# Patient Record
Sex: FEMALE | Employment: UNEMPLOYED | URBAN - METROPOLITAN AREA
[De-identification: names, ages, dates, MRNs, and addresses within clinical notes are randomized per-mention and may not be internally consistent; named-entity substitution may affect disease eponyms.]

---

## 2017-01-07 DIAGNOSIS — F41.9 ANXIETY: ICD-10-CM

## 2017-01-09 RX ORDER — ALPRAZOLAM 0.25 MG/1
TABLET ORAL
Qty: 30 TAB | Refills: 0 | Status: SHIPPED
Start: 2017-01-09 | End: 2017-05-10 | Stop reason: SDUPTHER

## 2017-01-25 ENCOUNTER — OFFICE VISIT (OUTPATIENT)
Dept: MEDICAL GROUP | Facility: CLINIC | Age: 67
End: 2017-01-25
Payer: MEDICARE

## 2017-01-25 VITALS
SYSTOLIC BLOOD PRESSURE: 120 MMHG | HEIGHT: 66 IN | WEIGHT: 137 LBS | RESPIRATION RATE: 16 BRPM | DIASTOLIC BLOOD PRESSURE: 80 MMHG | OXYGEN SATURATION: 98 % | BODY MASS INDEX: 22.02 KG/M2 | HEART RATE: 65 BPM | TEMPERATURE: 98.4 F

## 2017-01-25 DIAGNOSIS — R25.1 TREMOR: ICD-10-CM

## 2017-01-25 DIAGNOSIS — F41.9 ANXIETY: ICD-10-CM

## 2017-01-25 DIAGNOSIS — E78.5 DYSLIPIDEMIA: ICD-10-CM

## 2017-01-25 DIAGNOSIS — M85.80 OSTEOPENIA: ICD-10-CM

## 2017-01-25 DIAGNOSIS — E03.9 HYPOTHYROIDISM, UNSPECIFIED TYPE: ICD-10-CM

## 2017-01-25 PROCEDURE — G8432 DEP SCR NOT DOC, RNG: HCPCS | Performed by: INTERNAL MEDICINE

## 2017-01-25 PROCEDURE — G8419 CALC BMI OUT NRM PARAM NOF/U: HCPCS | Performed by: INTERNAL MEDICINE

## 2017-01-25 PROCEDURE — 1036F TOBACCO NON-USER: CPT | Performed by: INTERNAL MEDICINE

## 2017-01-25 PROCEDURE — 3017F COLORECTAL CA SCREEN DOC REV: CPT | Performed by: INTERNAL MEDICINE

## 2017-01-25 PROCEDURE — 4040F PNEUMOC VAC/ADMIN/RCVD: CPT | Mod: 8P | Performed by: INTERNAL MEDICINE

## 2017-01-25 PROCEDURE — 99214 OFFICE O/P EST MOD 30 MIN: CPT | Performed by: INTERNAL MEDICINE

## 2017-01-25 PROCEDURE — 1101F PT FALLS ASSESS-DOCD LE1/YR: CPT | Performed by: INTERNAL MEDICINE

## 2017-01-25 PROCEDURE — 3014F SCREEN MAMMO DOC REV: CPT | Performed by: INTERNAL MEDICINE

## 2017-01-25 PROCEDURE — G8484 FLU IMMUNIZE NO ADMIN: HCPCS | Performed by: INTERNAL MEDICINE

## 2017-01-25 RX ORDER — LEVOTHYROXINE SODIUM 0.07 MG/1
75 TABLET ORAL
Qty: 90 TAB | Refills: 3 | Status: SHIPPED | OUTPATIENT
Start: 2017-01-25 | End: 2017-05-10 | Stop reason: SDUPTHER

## 2017-01-25 ASSESSMENT — PATIENT HEALTH QUESTIONNAIRE - PHQ9: CLINICAL INTERPRETATION OF PHQ2 SCORE: 0

## 2017-01-25 NOTE — MR AVS SNAPSHOT
"        Lina Freeman   2017 2:00 PM   Office Visit   MRN: [id number]    Department:  [facility]   Dept Phone:  [phone]    Description:  Female : 1950   Provider:  Yaquelin Jane M.D.           Allergies as of 2017     No Known Allergies      You were diagnosed with     Hypothyroidism, unspecified type   [8914570]       Tremor   [689355]       Osteopenia   [635136]       Dyslipidemia   [901338]         Vital Signs     Blood Pressure Pulse Temperature Respirations Height Weight    120/80 mmHg 65 36.9 °C (98.4 °F) 16 1.664 m (5' 5.5\") 62.143 kg (137 lb)    Body Mass Index Oxygen Saturation Last Menstrual Period Smoking Status          22.44 kg/m2 98% 2000 Former Smoker        Basic Information     Date Of Birth Sex Race Ethnicity Preferred Language    1950 Female Unknown Non- English      Problem List              ICD-10-CM Priority Class Noted - Resolved    Hypothyroidism E03.9   Unknown - Present    Osteopenia M85.80   Unknown - Present    Anxiety F41.9   2013 - Present    Tremor R25.1   2014 - Present    Essential tremor G25.0   2016 - Present      Health Maintenance        Date Due Completion Dates    IMM DTaP/Tdap/Td Vaccine (1 - Tdap) 1970 ---    PAP SMEAR 1972 ---    IMM ZOSTER VACCINE 2011 ---    IMM PNEUMOCOCCAL 65+ (ADULT) LOW/MEDIUM RISK SERIES (1 of 2 - PCV13) 2016 ---    IMM INFLUENZA (1) 2016 ---    MAMMOGRAM 2017, 2016, 6/3/2015 (Done), 1/15/2014, 2012, 2011    Override on 6/3/2015: Done    BONE DENSITY 2021, 1/15/2014, 2011    COLONOSCOPY 2026            Current Immunizations     No immunizations on file.      Below and/or attached are the medications your provider expects you to take. Review all of your home medications and newly ordered medications with your provider and/or pharmacist. Follow medication instructions as directed by your " provider and/or pharmacist. Please keep your medication list with you and share with your provider. Update the information when medications are discontinued, doses are changed, or new medications (including over-the-counter products) are added; and carry medication information at all times in the event of emergency situations     Allergies:  No Known Allergies          Medications  Valid as of: January 25, 2017 -  4:50 PM    Generic Name Brand Name Tablet Size Instructions for use    ALPRAZolam (Tab) XANAX 0.25 MG TAKE 1 TABLET BY MOUTH AT BEDTIME AS NEEDED FOR SLEEP        Levothyroxine Sodium (Tab) SYNTHROID 75 MCG Take 1 Tab by mouth Every morning on an empty stomach.        .                 Medicines prescribed today were sent to:     [facility] - [place], [place] - [address]    [address] [place] [address]    Phone: [phone] Fax: [phone]    Open 24 Hours?: No      Medication refill instructions:       If your prescription bottle indicates you have medication refills left, it is not necessary to call your provider’s office. Please contact your pharmacy and they will refill your medication.    If your prescription bottle indicates you do not have any refills left, you may request refills at any time through one of the following ways: The online MobileRQ system (except Urgent Care), by calling your provider’s office, or by asking your pharmacy to contact your provider’s office with a refill request. Medication refills are processed only during regular business hours and may not be available until the next business day. Your provider may request additional information or to have a follow-up visit with you prior to refilling your medication.   *Please Note: Medication refills are assigned a new Rx number when refilled electronically. Your pharmacy may indicate that no refills were authorized even though a new prescription for the same medication is available at the pharmacy. Please request the  medicine by name with the pharmacy before contacting your provider for a refill.        Your To Do List     Future Labs/Procedures Complete By Expires    LIPOPROTEIN A  As directed 1/25/2018    VITAMIN D,25 HYDROXY  As directed 1/25/2018         Gigal[employer] Access Code: Activation code not generated  Current Lono[employer] Status: Active

## 2017-01-25 NOTE — PROGRESS NOTES
"Subjective:  Lina is a 66 y.o. female with the following   Past Medical History   Diagnosis Date   • Hypothyroidism    • Osteopenia    • Hyperlipidemia    • Tremor    • Osteopenia       Family History   Problem Relation Age of Onset   • Cancer Mother      breast cancer   • Stroke Mother    • Hypertension Mother    • Cancer Father      lung cancer     The patient is on the following medications:   Current outpatient prescriptions:   •  alprazolam (XANAX) 0.25 MG Tab, TAKE 1 TABLET BY MOUTH AT BEDTIME AS NEEDED FOR SLEEP, Disp: 30 Tab, Rfl: 0  •  levothyroxine (SYNTHROID) 75 MCG Tab, Take 1 Tab by mouth Every morning on an empty stomach., Disp: 90 Tab, Rfl: 1    HPI; patient is here today for follow-up visit, currently on Synthroid for hypothyroidism denies having fatigue or constipation, on  vitamin D supplements for osteopenia denies having back pain, recent fall or injuries, on Xanax for tremor and anxiety denies having suicidal ideation, motor and sensory loss.     ROS:  See HPI    Blood pressure 120/80, pulse 65, temperature 36.9 °C (98.4 °F), resp. rate 16, height 1.664 m (5' 5.5\"), weight 62.143 kg (137 lb), last menstrual period 01/01/2000, SpO2 98 %.on RA  Objective:  Patient is well appearing and in no acute distress.  Pharynx is clear.  Neck is soft and supple with no cervical or supraclavicular lymphadenopathy, thyromegaly or masses, no JVD.  Lungs clear to auscultation bilaterally with normal respiratory effort. Abdomen soft, non-tender on palpation,not distended. Heart regular rate and rhythm without murmur. Extremities without any clubbing, cyanosis, or edema.    Assessment and Plan:          1. Hypothyroidism; clinically is  stable on Synthroid 75 MCG daily.      2. Essential tremor; response to Xanax as needed      3. Anxiety; response to Xanax as needed      4. History of dyslipidemia; patient has preferred no medical therapy.       5. Osteopenia; on Actonel honeymoon, and vitamin D supplements, " currently is asymptomatic.      Patient is advised on preventive and supportive care, diet and lifestyle modification, daily walking ,exercise ,  alternative therapies for prevention of osteoporosis, continue current meds and monitor labs.  Please note that this dictation was created using voice recognition software. I have worked with consultants from the vendor as well as technical experts from [facility] to optimize the interface. I have made every reasonable attempt to correct obvious errors, but I expect that there are errors of grammar and possibly content that I did not discover before finalizing the note.

## 2017-02-03 ENCOUNTER — HOSPITAL ENCOUNTER (OUTPATIENT)
Dept: LAB | Facility: MEDICAL CENTER | Age: 67
End: 2017-02-03
Attending: INTERNAL MEDICINE
Payer: MEDICARE

## 2017-02-03 DIAGNOSIS — M85.80 OSTEOPENIA: ICD-10-CM

## 2017-02-03 DIAGNOSIS — E78.5 DYSLIPIDEMIA: ICD-10-CM

## 2017-02-03 LAB
25(OH)D3 SERPL-MCNC: 36 NG/ML (ref 30–100)
T4 FREE SERPL-MCNC: 1.09 NG/DL (ref 0.53–1.43)
TSH SERPL DL<=0.005 MIU/L-ACNC: 0.61 UIU/ML (ref 0.3–3.7)

## 2017-02-03 PROCEDURE — 84439 ASSAY OF FREE THYROXINE: CPT

## 2017-02-03 PROCEDURE — 82306 VITAMIN D 25 HYDROXY: CPT

## 2017-02-03 PROCEDURE — 84443 ASSAY THYROID STIM HORMONE: CPT

## 2017-02-03 PROCEDURE — 83695 ASSAY OF LIPOPROTEIN(A): CPT

## 2017-02-03 PROCEDURE — 36415 COLL VENOUS BLD VENIPUNCTURE: CPT

## 2017-02-05 LAB — LPA SERPL-MCNC: 15 MG/DL

## 2017-03-21 ENCOUNTER — PATIENT OUTREACH (OUTPATIENT)
Dept: HEALTH INFORMATION MANAGEMENT | Facility: OTHER | Age: 67
End: 2017-03-21

## 2017-03-30 NOTE — PROGRESS NOTES
Attempt #:2    Verify PCP: yes    Communication Preference Obtained: yes     Review Care Team: yes    Annual Wellness Visit Scheduling  1. Scheduling Status:Scheduled          Care Gap Scheduling (Attempt to Schedule EACH Overdue Care Gap!)     Health Maintenance Due   Topic Date Due   • Annual Wellness Visit  SCHEDULED   • PAP SMEAR  INFORMED PT SHE IS DUE         MyChart Activation: already active  [employer] Micheline: no  Virtual Visits: no  Opt In to Text Messages: no

## 2017-04-12 ENCOUNTER — TELEPHONE (OUTPATIENT)
Dept: MEDICAL GROUP | Facility: CLINIC | Age: 67
End: 2017-04-12

## 2017-04-12 NOTE — TELEPHONE ENCOUNTER
ANNUAL WELLNESS VISIT PRE-VISIT PLANNING     1.  Reviewed last PCP office visit assessment and plan notes: Yes    2.  If any orders were placed last visit do we have Results/Consult Notes?        •  Labs? No , Labs NOT completed        •  Imaging? No        •  Referrals?   3.  Patient Care Coordination Note was updated with diagnosis information:  No    4.  Patient is due for these Health Maintenance Topics:   Health Maintenance Due   Topic Date Due   • Annual Wellness Visit  1950   • PAP SMEAR  04/21/1972          •  BRYANT letter was faxed to:   for  records    5.  Immunizations were updated in Epic using WebIZ?: NO, NOT FOUND IN WEB iz       •  Web Iz Recommendations: Patient declines Immunizations recommendations @ this time         •  Is patient due for Tdap/Shingles? Yes.  If yes, was patient alerted of copay?              7.  Updated Care Team with [employer] and all specialists?        •   Gait devices, O2, CPAP, etc: no        •   Eye professional: yes       •   Other specialists (GYN, cardiology, endo, etc): yes    8.  Is patient in need of any refills prior to office visit? No       •    Separate refill encounter created?: no    9.  Patient was informed to arrive 15 min prior to their scheduled appointment and bring in their medication bottles? yes    10.  Patient was advised: “This is a free wellness visit. The provider will screen for medical conditions to help you stay healthy. If you have other concerns to address you may be asked to discuss these at a separate visit or there may be an additional fee.”  Yes

## 2017-05-10 ENCOUNTER — OFFICE VISIT (OUTPATIENT)
Dept: MEDICAL GROUP | Facility: CLINIC | Age: 67
End: 2017-05-10
Payer: MEDICARE

## 2017-05-10 VITALS
SYSTOLIC BLOOD PRESSURE: 122 MMHG | WEIGHT: 135 LBS | OXYGEN SATURATION: 97 % | BODY MASS INDEX: 21.69 KG/M2 | DIASTOLIC BLOOD PRESSURE: 72 MMHG | HEART RATE: 77 BPM | RESPIRATION RATE: 16 BRPM | TEMPERATURE: 99.4 F | HEIGHT: 66 IN

## 2017-05-10 DIAGNOSIS — M85.80 OSTEOPENIA, UNSPECIFIED LOCATION: ICD-10-CM

## 2017-05-10 DIAGNOSIS — E03.9 HYPOTHYROIDISM, UNSPECIFIED TYPE: ICD-10-CM

## 2017-05-10 DIAGNOSIS — R25.1 TREMOR: ICD-10-CM

## 2017-05-10 DIAGNOSIS — Z23 NEED FOR TDAP VACCINATION: ICD-10-CM

## 2017-05-10 PROCEDURE — 90715 TDAP VACCINE 7 YRS/> IM: CPT | Performed by: INTERNAL MEDICINE

## 2017-05-10 PROCEDURE — 1036F TOBACCO NON-USER: CPT | Performed by: INTERNAL MEDICINE

## 2017-05-10 PROCEDURE — 90471 IMMUNIZATION ADMIN: CPT | Performed by: INTERNAL MEDICINE

## 2017-05-10 PROCEDURE — G0438 PPPS, INITIAL VISIT: HCPCS | Mod: 25 | Performed by: INTERNAL MEDICINE

## 2017-05-10 RX ORDER — CALCIUM CARBONATE 300MG(750)
1 TABLET,CHEWABLE ORAL DAILY
COMMUNITY

## 2017-05-10 RX ORDER — ELECTROLYTES/DEXTROSE
1 SOLUTION, ORAL ORAL DAILY
COMMUNITY

## 2017-05-10 RX ORDER — LEVOTHYROXINE SODIUM 0.07 MG/1
75 TABLET ORAL
Qty: 90 TAB | Refills: 3 | Status: SHIPPED | OUTPATIENT
Start: 2017-05-10 | End: 2018-05-16 | Stop reason: SDUPTHER

## 2017-05-10 RX ORDER — ALPRAZOLAM 0.25 MG/1
TABLET ORAL
Qty: 30 TAB | Refills: 3 | Status: SHIPPED | OUTPATIENT
Start: 2017-05-10 | End: 2019-05-07

## 2017-05-10 ASSESSMENT — PATIENT HEALTH QUESTIONNAIRE - PHQ9
CLINICAL INTERPRETATION OF PHQ2 SCORE: 0
SUM OF ALL RESPONSES TO PHQ QUESTIONS 1-9: 0

## 2017-05-10 NOTE — PROGRESS NOTES
Chief Complaint   Patient presents with   • Annual Wellness Visit         HPI:  Lina Freeman is a 67 y.o. female here for Medicare Annual Wellness Visit. Currently on Synthroid for hypothyroidism denies having cold intolerance or fatigue, on vitamin D and oral supplements for osteopenia and is having back pain, on  Xanax as needed for essential tremor denies having motor or sensory loss.         Patient Active Problem List    Diagnosis Date Noted   • Essential tremor 02/26/2016   • Tremor 01/22/2014   • Anxiety 05/23/2013   • Hypothyroidism    • Osteopenia        Current Outpatient Prescriptions   Medication Sig Dispense Refill   • Multiple Vitamins-Minerals (MULTIVITAMIN ADULT) Tab Take 1 Tab by mouth every day.     • Calcium Carb-Cholecalciferol (CALCIUM 1000 + D PO) Take 1 Tab by mouth every day.     • Cholecalciferol (VITAMIN D-3 PO) Take 4,600 Units by mouth every day.     • Magnesium 400 MG Tab Take 1 Tab by mouth every day.     • Omega 3-6-9 Fatty Acids (TRIPLE OMEGA COMPLEX PO) Take 1 Tab by mouth every day.     • B Complex-C (SUPER B COMPLEX PO) Take 1 Tab by mouth every day.     • levothyroxine (SYNTHROID) 75 MCG Tab Take 1 Tab by mouth Every morning on an empty stomach. 90 Tab 3   • alprazolam (XANAX) 0.25 MG Tab TAKE 1 TABLET BY MOUTH AT BEDTIME AS NEEDED FOR SLEEP 30 Tab 0     No current facility-administered medications for this visit.        Patient is taking medications as noted in medication list.  Current supplements as per medication list.   Chronic narcotic pain medicines: no    Allergies: Review of patient's allergies indicates no known allergies.    Current social contact/activities: Pt. Enjoys attending the GYM, spends time with her family, pt. Enjoys taking care of her garden, pt. Enjoys spending time with her neighbors and pt. Volunteers for the [employer] 1-2 times per week.     Is patient current with immunizations?  No, due for PREVNAR (PCV13) , TDAP and ZOSTAVAX (Shingles).  Patient is interested in receiving TDAP today.     She  reports that she quit smoking about 28 years ago. Her smoking use included Cigarettes. She has a 20 pack-year smoking history. She has never used smokeless tobacco. She reports that she drinks about 1.0 oz of alcohol per week. She reports that she does not use illicit drugs.  Counseling given: Not Answered        DPA/Advanced Directive:  Patient has Advanced Directive on file.     ROS:    Gait: Uses no assistive device   Ostomy: no  Other tubes: no   Amputations: no   Chronic oxygen use: no   Last eye exam: 7/15/2016   Wears hearing aids: no   : Denies incontinence.         Depression Screening    Little interest or pleasure in doing things?  0 - not at all  Feeling down, depressed, or hopeless?  0 - not at all  Patient Health Questionnaire Score: 0  If depressive symptoms identified deferred to follow up visit unless specifically addressed in assessment and plan.    Interpretation of PHQ-9 Total Score   Score Severity   1-4 Minimal Depression   5-9 Mild Depression   10-14 Moderate Depression   15-19 Moderately Severe Depression   20-27 Severe Depression    Screening for Cognitive Impairment    Three Minute Recall (banana, sunrise, fence)  3/3    Draw clock face with all 12 numbers set to the hand to show 10 minutes past 11 o'clock  1 5/5  If cognitive concerns identified deferred to follow up visit unless specifically addressed in assessment and plan.    Fall Risk Assessment    Has the patient had two or more falls in the last year or any fall with injury in the last year?  No  If Fall Risk identified deferred to follow up visit unless specifically addressed in assessment and plan.    Safety Assessment    Throw rugs on floor.  Yes  Handrails on all stairs.  Yes  Good lighting in all hallways.  Yes  Difficulty hearing.  No  Patient counseled about all safety risks that were identified.    Functional Assessment ADLs    Are there any barriers preventing you  from cooking for yourself or meeting nutritional needs?  No.    Are there any barriers preventing you from driving safely or obtaining transportation?  No.    Are there any barriers preventing you from using a telephone or calling for help?  No.    Are there any barriers preventing you from shopping?  No.    Are there any barriers preventing you from taking care of your own finances?  No.    Are there any barriers preventing you from managing your medications?  No.    Are currently engaging any exercise or physical activity?  Yes.  Pt. Attends yoga once a week with 1 hr classes.   Pt. Walks about 2-3 times/wk for at least 2 miles each.  Pt. Works out with weight lifting for approximately 15mt each.     Health Maintenance Summary                Annual Wellness Visit Overdue 1950     IMM PNEUMOCOCCAL 65+ (ADULT) LOW/MEDIUM RISK SERIES Postponed 3/30/2018 Originally 4/21/2016. Patient Refused    IMM DTaP/Tdap/Td Vaccine Postponed 3/30/2018 Originally 4/21/1970. Patient Refused    IMM ZOSTER VACCINE Postponed 3/30/2018 Originally 4/21/2011. Patient Refused    MAMMOGRAM Next Due 7/1/2017      Done 7/1/2016 MA-SCREEN MAMMO W/CAD-BILAT     Patient has more history with this topic...    BONE DENSITY Next Due 7/22/2021      Done 7/22/2016 DS-BONE DENSITY STUDY (DEXA)     Patient has more history with this topic...    COLONOSCOPY Next Due 6/8/2026      Done 6/8/2016 AMB REFERRAL TO GI FOR COLONOSCOPY          Patient Care Team:  Yaquelin Jane M.D. as PCP - General (Internal Medicine)  Noé Thompson O.D. as Consulting Physician (Optometry)  Laura Patel M.D. as Consulting Physician (OB/Gyn)  Nate Turcios D.D.SClary as Consulting Physician (Dentistry)    Social History   Substance Use Topics   • Smoking status: Former Smoker -- 1.00 packs/day for 20 years     Types: Cigarettes     Quit date: 02/26/1989   • Smokeless tobacco: Never Used   • Alcohol Use: 1.0 oz/week     2 Cans of beer per week      Comment: 2-3 x  "week     Family History   Problem Relation Age of Onset   • Cancer Mother      breast cancer   • Stroke Mother    • Hypertension Mother    • Cancer Father      lung cancer     She  has a past medical history of Hypothyroidism; Osteopenia; Hyperlipidemia; Tremor; and Osteopenia.   History reviewed. No pertinent past surgical history.        Exam:     Blood pressure 122/72, pulse 77, temperature 37.4 °C (99.4 °F), resp. rate 16, height 1.67 m (5' 5.75\"), weight 61.236 kg (135 lb), last menstrual period 01/01/2000, SpO2 97 %. Body mass index is 21.96 kg/(m^2).    Hearing; good  Dentition; good  Alert, oriented in no acute distress.  Eye contact is good, speech goal directed, affect calm      Assessment and Plan. The following treatment and monitoring plan is recommended:      1. Hypothyroidism; clinically is  stable on Synthroid 75 MCG daily.       Ref. Range 2/29/2016 07:45 8/12/2016 12:32 2/3/2017 12:12   TSH Latest Ref Range: 0.300-3.700 uIU/mL 1.540 0.610 0.610   Free T-4 Latest Ref Range: 0.53-1.43 ng/dL 1.02 1.20 1.09     2. Essential tremor; responds to Xanax as needed        3. History of dyslipidemia; patient has preferred no medical therapy.        Ref. Range 2/29/2016 07:45 2/3/2017 12:12   Cholesterol,Tot Latest Ref Range: 100-199 mg/dL 217 (H)    Triglycerides Latest Ref Range: 0-149 mg/dL 91    HDL Latest Ref Range: >=40 mg/dL 89    LDL Latest Ref Range: <100 mg/dL 110 (H)    Lipoprotein A Latest Ref Range: <=29 mg/dL  15     4. Osteopenia; on Actonel honeymoon, and vitamin D supplements, currently is asymptomatic.      5. Request of Tdab vaccination.    Patient is advised on preventive and supportive care, diet and lifestyle modification, daily walking ,exercise , Tdab vaccine  continue current meds and monitor labs.  Services suggested;  Health Care Screening recommendations as per orders if indicated.  Referrals offered: PT/OT/Nutrition counseling/Behavioral Health/Smoking cessation as per orders if " indicated.    Discussion today about general wellness and lifestyle habits:    · Prevent falls and reduce trip hazards; Cautioned about securing or removing rugs.  · Have a working fire alarm and carbon monoxide detector;   · Engage in regular physical activity and social activities       Follow-up: No Follow-up on file.

## 2017-05-12 ENCOUNTER — HOSPITAL ENCOUNTER (OUTPATIENT)
Dept: RADIOLOGY | Facility: MEDICAL CENTER | Age: 67
End: 2017-05-12
Attending: OBSTETRICS & GYNECOLOGY
Payer: MEDICARE

## 2017-05-12 DIAGNOSIS — N63.0 LUMP OR MASS IN BREAST: ICD-10-CM

## 2017-05-12 PROCEDURE — G0204 DX MAMMO INCL CAD BI: HCPCS

## 2017-05-12 PROCEDURE — 76642 ULTRASOUND BREAST LIMITED: CPT | Mod: RT

## 2017-06-06 ENCOUNTER — HOSPITAL ENCOUNTER (OUTPATIENT)
Facility: MEDICAL CENTER | Age: 67
End: 2017-06-06
Attending: OBSTETRICS & GYNECOLOGY
Payer: MEDICARE

## 2017-06-06 PROCEDURE — 88175 CYTOPATH C/V AUTO FLUID REDO: CPT

## 2017-06-07 LAB — CYTOLOGY REG CYTOL: NORMAL

## 2017-10-30 ENCOUNTER — PATIENT OUTREACH (OUTPATIENT)
Dept: HEALTH INFORMATION MANAGEMENT | Facility: OTHER | Age: 67
End: 2017-10-30

## 2017-10-30 NOTE — PROGRESS NOTES
Outcome: Left Message    Please transfer to Patient Outreach Team at [phone] when patient returns call.    WebIZ Checked & Epic Updated:  yes    [facility]Connect Verified: yes    Attempt # 1

## 2018-02-27 ENCOUNTER — OFFICE VISIT (OUTPATIENT)
Dept: MEDICAL GROUP | Facility: PHYSICIAN GROUP | Age: 68
End: 2018-02-27
Payer: MEDICARE

## 2018-02-27 VITALS
DIASTOLIC BLOOD PRESSURE: 68 MMHG | SYSTOLIC BLOOD PRESSURE: 116 MMHG | TEMPERATURE: 98.2 F | WEIGHT: 135 LBS | BODY MASS INDEX: 21.69 KG/M2 | OXYGEN SATURATION: 98 % | HEART RATE: 79 BPM | HEIGHT: 66 IN

## 2018-02-27 DIAGNOSIS — L98.9 BENIGN SKIN LESION OF NOSE: ICD-10-CM

## 2018-02-27 PROCEDURE — 99213 OFFICE O/P EST LOW 20 MIN: CPT | Performed by: FAMILY MEDICINE

## 2018-02-27 NOTE — PROGRESS NOTES
"Subjective:   Lina Freeman is a 67 y.o. female here today for bump on nose    HPI :     Small bump on the left side of the nose , close to the brow that has been there for 2 weeks.No warmth or tenderness but hurts to pt on glasses for the last 2 days.Chronically ongoing for several years, comes and goes.    Current medicines (including changes today)  Current Outpatient Prescriptions   Medication Sig Dispense Refill   • Multiple Vitamins-Minerals (MULTIVITAMIN ADULT) Tab Take 1 Tab by mouth every day.     • Calcium Carb-Cholecalciferol (CALCIUM 1000 + D PO) Take 1 Tab by mouth every day.     • Cholecalciferol (VITAMIN D-3 PO) Take 4,600 Units by mouth every day.     • Magnesium 400 MG Tab Take 1 Tab by mouth every day.     • Omega 3-6-9 Fatty Acids (TRIPLE OMEGA COMPLEX PO) Take 1 Tab by mouth every day.     • B Complex-C (SUPER B COMPLEX PO) Take 1 Tab by mouth every day.     • levothyroxine (SYNTHROID) 75 MCG Tab Take 1 Tab by mouth Every morning on an empty stomach. 90 Tab 3   • alprazolam (XANAX) 0.25 MG Tab TAKE 1 TABLET BY MOUTH AT BEDTIME AS NEEDED FOR SLEEP 30 Tab 3     No current facility-administered medications for this visit.      She  has a past medical history of Hyperlipidemia; Hypothyroidism; Osteopenia; Osteopenia; and Tremor.    ROS   No chest pain, no shortness of breath, no abdominal pain  No fever, open wounds, headache, nasal congestion, cough, sinus pressure      Objective:     Blood pressure 116/68, pulse 79, temperature 36.8 °C (98.2 °F), height 1.67 m (5' 5.75\"), weight 61.2 kg (135 lb), last menstrual period 01/01/2000, SpO2 98 %, not currently breastfeeding. Body mass index is 21.96 kg/m².     PHYSICAL EXAM     GEN: Alert and oriented,well appearing, no acute distress  SKIN: warm, dry to touch, non tender mobile non erythematous nodule on the left side of the bridge of the nose  PSYCH: mood and affect normal, judgement normal  RESPIRATORY : Unlabored respiratory effort, no " distress noted, clear to auscultation bilaterally, no wheeze, rhonchi, crackles  CARDIOVASCULAR: RRR, S1 S2 normal  MUSCULOSKELETAL : Normal gait and stance, no obvious abnormalities   NEURO: No overt focal neurologic deficits,sensation intact        Assessment and Plan:   The following treatment plan was discussed    1. Benign skin lesion of nose  Unclear etiology but most likely benign.No signs of infection.reassured patient and advised to monitor.Patient also has appointment with Dermatologist and and will check with her.    Followup: Return if symptoms worsen or fail to improve.         Please note that this dictation was created using voice recognition software. I have made every reasonable attempt to correct obvious errors, but I expect that there are errors of grammar and possibly content that I did not discover before finalizing the note.

## 2018-05-11 ENCOUNTER — OFFICE VISIT (OUTPATIENT)
Dept: MEDICAL GROUP | Facility: PHYSICIAN GROUP | Age: 68
End: 2018-05-11
Payer: MEDICARE

## 2018-05-11 VITALS
TEMPERATURE: 97.1 F | DIASTOLIC BLOOD PRESSURE: 76 MMHG | OXYGEN SATURATION: 99 % | HEART RATE: 62 BPM | BODY MASS INDEX: 21.69 KG/M2 | WEIGHT: 135 LBS | SYSTOLIC BLOOD PRESSURE: 126 MMHG | HEIGHT: 66 IN

## 2018-05-11 DIAGNOSIS — N39.41 URGE INCONTINENCE: ICD-10-CM

## 2018-05-11 DIAGNOSIS — E03.9 ACQUIRED HYPOTHYROIDISM: ICD-10-CM

## 2018-05-11 DIAGNOSIS — G25.0 ESSENTIAL TREMOR: ICD-10-CM

## 2018-05-11 DIAGNOSIS — E78.5 DYSLIPIDEMIA: ICD-10-CM

## 2018-05-11 DIAGNOSIS — R79.89 LOW SERUM VITAMIN D: ICD-10-CM

## 2018-05-11 DIAGNOSIS — M85.89 OSTEOPENIA OF MULTIPLE SITES: ICD-10-CM

## 2018-05-11 PROCEDURE — 99215 OFFICE O/P EST HI 40 MIN: CPT | Performed by: FAMILY MEDICINE

## 2018-05-11 ASSESSMENT — PATIENT HEALTH QUESTIONNAIRE - PHQ9: CLINICAL INTERPRETATION OF PHQ2 SCORE: 0

## 2018-05-11 NOTE — ASSESSMENT & PLAN NOTE
New  problem. Patient is reporting that she has issues with having the urge to urinate and barely making it to the bathroom. She states that this occurs off and on. At this point she does not ascribe any embarrassing episodes.

## 2018-05-11 NOTE — ASSESSMENT & PLAN NOTE
Ongoing issue; patient reports compliance with her levothyroxine 75 µg daily; currently denies any issues or temperature intolerance, skin changes, hair changes but is reporting some fatigue. Labs have not been evaluated and over one year.

## 2018-05-11 NOTE — ASSESSMENT & PLAN NOTE
Ongoing issue; patient denies any new issues with long bone or vertebral fractures; she does weightbearing exercise, takes calcium, take supplemental vitamin D.

## 2018-05-11 NOTE — ASSESSMENT & PLAN NOTE
Ongoing issue; patient does report a tremor they can get worse with activity or anxious situations. In the past she had been given Xanax 0.25 mg to use as needed for symptom management. I have explained to the patient due to the controlled substance [place] I will not be refilling the medication and would prefer to change her over to a noncontrolled substance. Patient states that her symptoms are not that bad and she otherwise will not seek further treatment.

## 2018-05-11 NOTE — PROGRESS NOTES
Subjective:   Lina Freeman is a 68 y.o. female here today for thyroid, urge incontinence, low vitamin D, elevated total cholesterol, osteopenia, essential tremor    Hypothyroidism  Ongoing issue; patient reports compliance with her levothyroxine 75 µg daily; currently denies any issues or temperature intolerance, skin changes, hair changes but is reporting some fatigue. Labs have not been evaluated and over one year.    Osteopenia  Ongoing issue; patient denies any new issues with long bone or vertebral fractures; she does weightbearing exercise, takes calcium, take supplemental vitamin D.    Essential tremor  Ongoing issue; patient does report a tremor they can get worse with activity or anxious situations. In the past she had been given Xanax 0.25 mg to use as needed for symptom management. I have explained to the patient due to the controlled substance [place] I will not be refilling the medication and would prefer to change her over to a noncontrolled substance. Patient states that her symptoms are not that bad and she otherwise will not seek further treatment.    Low serum vitamin D  Ongoing issue; review patient's chart shows that in the past her vitamin D level has been suboptimal at 33. As stated above she does take supplement of vitamin D 3000 international units daily    Urge incontinence  New  problem. Patient is reporting that she has issues with having the urge to urinate and barely making it to the bathroom. She states that this occurs off and on. At this point she does not ascribe any embarrassing episodes.    Dyslipidemia  Ongoing issues; review patient's chart shows in the past she has had an elevated total cholesterol. She currently does not take any prescription medications for this issue. Does take fish oil supplements and tries eating healthy diet.     Patient is here today to establish care    Current medicines (including changes today)  Current Outpatient Prescriptions  "  Medication Sig Dispense Refill   • Multiple Vitamins-Minerals (MULTIVITAMIN ADULT) Tab Take 1 Tab by mouth every day.     • Calcium Carb-Cholecalciferol (CALCIUM 1000 + D PO) Take 1 Tab by mouth every day.     • Cholecalciferol (VITAMIN D-3 PO) Take 4,600 Units by mouth every day.     • Magnesium 400 MG Tab Take 1 Tab by mouth every day.     • Omega 3-6-9 Fatty Acids (TRIPLE OMEGA COMPLEX PO) Take 1 Tab by mouth every day.     • B Complex-C (SUPER B COMPLEX PO) Take 1 Tab by mouth every day.     • levothyroxine (SYNTHROID) 75 MCG Tab Take 1 Tab by mouth Every morning on an empty stomach. 90 Tab 3   • alprazolam (XANAX) 0.25 MG Tab TAKE 1 TABLET BY MOUTH AT BEDTIME AS NEEDED FOR SLEEP 30 Tab 3     No current facility-administered medications for this visit.      She  has a past medical history of Hyperlipidemia; Hypothyroidism; Osteopenia; Osteopenia; and Tremor.    ROS   No chest pain, no shortness of breath, no abdominal pain  + Urge incontinence of urine     Objective:     Blood pressure 126/76, pulse 62, temperature 36.2 °C (97.1 °F), height 1.67 m (5' 5.75\"), weight 61.2 kg (135 lb), last menstrual period 01/01/2000, SpO2 99 %. Body mass index is 21.96 kg/m².   Physical Exam:  Alert, oriented in no acute distress.  Eye contact is good, speech goal directed, affect calm  HEENT: conjunctiva non-injected, sclera non-icteric.  Pinna normal. TM pearly gray.   Oral mucous membranes pink and moist with no lesions.  Neck No adenopathy or masses in the neck or supraclavicular regions.  Lungs: clear to auscultation bilaterally with good excursion.  CV: regular rate and rhythm.  Abdomen: soft, nontender, No CVAT  Ext: no edema, color normal, vascularity normal, temperature normal  Neuro: Cranial nerves II through XII grossly intact      Assessment and Plan:   The following treatment plan was discussed     1. Acquired hypothyroidism  TSH    FREE THYROXINE    Stable. Continue current medications; labs ordered for " reevaluation; monitor for results   2. Osteopenia of multiple sites  DS-BONE DENSITY STUDY (DEXA)    Stable. Continue supplementation and weightbearing exercise; repeat bone scan ordered; monitor for results   3. Essential tremor      Stable. Patient declined propranolol description; declined referral to neurology; monitor   4. Low serum vitamin D  VITAMIN D,25 HYDROXY    Uncontrolled; continue supplementation; labs ordered for reevaluation; monitor   5. Urge incontinence      Uncontrolled. Patient will do timed voiding to help reduce symptoms of urge incontinence; monitor   6. Dyslipidemia  COMP METABOLIC PANEL    LIPID PROFILE    Uncontrolled; labs ordered for reevaluation; monitor for results     Greater than 40 minutes was spent in chart review, assessment, plan of care, consultation with the patient; greater than 50% time spent in face-to-face consultation with the patient on the above noted assessment and plan of care.    Followup: Return in about 6 months (around 11/11/2018) for medication review, Short.

## 2018-05-11 NOTE — ASSESSMENT & PLAN NOTE
Ongoing issue; review patient's chart shows that in the past her vitamin D level has been suboptimal at 33. As stated above she does take supplement of vitamin D 3000 international units daily

## 2018-05-11 NOTE — ASSESSMENT & PLAN NOTE
Ongoing issues; review patient's chart shows in the past she has had an elevated total cholesterol. She currently does not take any prescription medications for this issue. Does take fish oil supplements and tries eating healthy diet.

## 2018-05-14 ENCOUNTER — HOSPITAL ENCOUNTER (OUTPATIENT)
Dept: LAB | Facility: MEDICAL CENTER | Age: 68
End: 2018-05-14
Attending: FAMILY MEDICINE
Payer: MEDICARE

## 2018-05-14 DIAGNOSIS — E78.5 DYSLIPIDEMIA: ICD-10-CM

## 2018-05-14 DIAGNOSIS — E03.9 ACQUIRED HYPOTHYROIDISM: ICD-10-CM

## 2018-05-14 DIAGNOSIS — R79.89 LOW SERUM VITAMIN D: ICD-10-CM

## 2018-05-14 LAB
25(OH)D3 SERPL-MCNC: 35 NG/ML (ref 30–100)
ALBUMIN SERPL BCP-MCNC: 4.2 G/DL (ref 3.2–4.9)
ALBUMIN/GLOB SERPL: 1.9 G/DL
ALP SERPL-CCNC: 51 U/L (ref 30–99)
ALT SERPL-CCNC: 15 U/L (ref 2–50)
ANION GAP SERPL CALC-SCNC: 4 MMOL/L (ref 0–11.9)
AST SERPL-CCNC: 16 U/L (ref 12–45)
BILIRUB SERPL-MCNC: 0.5 MG/DL (ref 0.1–1.5)
BUN SERPL-MCNC: 12 MG/DL (ref 8–22)
CALCIUM SERPL-MCNC: 9.3 MG/DL (ref 8.5–10.5)
CHLORIDE SERPL-SCNC: 107 MMOL/L (ref 96–112)
CHOLEST SERPL-MCNC: 198 MG/DL (ref 100–199)
CO2 SERPL-SCNC: 27 MMOL/L (ref 20–33)
CREAT SERPL-MCNC: 0.79 MG/DL (ref 0.5–1.4)
GLOBULIN SER CALC-MCNC: 2.2 G/DL (ref 1.9–3.5)
GLUCOSE SERPL-MCNC: 94 MG/DL (ref 65–99)
HDLC SERPL-MCNC: 92 MG/DL
LDLC SERPL CALC-MCNC: 89 MG/DL
POTASSIUM SERPL-SCNC: 4.3 MMOL/L (ref 3.6–5.5)
PROT SERPL-MCNC: 6.4 G/DL (ref 6–8.2)
SODIUM SERPL-SCNC: 138 MMOL/L (ref 135–145)
T4 FREE SERPL-MCNC: 1.1 NG/DL (ref 0.53–1.43)
TRIGL SERPL-MCNC: 86 MG/DL (ref 0–149)
TSH SERPL DL<=0.005 MIU/L-ACNC: 0.9 UIU/ML (ref 0.38–5.33)

## 2018-05-14 PROCEDURE — 80053 COMPREHEN METABOLIC PANEL: CPT

## 2018-05-14 PROCEDURE — 80061 LIPID PANEL: CPT

## 2018-05-14 PROCEDURE — 82306 VITAMIN D 25 HYDROXY: CPT

## 2018-05-14 PROCEDURE — 36415 COLL VENOUS BLD VENIPUNCTURE: CPT

## 2018-05-14 PROCEDURE — 84443 ASSAY THYROID STIM HORMONE: CPT

## 2018-05-14 PROCEDURE — 84439 ASSAY OF FREE THYROXINE: CPT

## 2018-05-15 ENCOUNTER — HOSPITAL ENCOUNTER (OUTPATIENT)
Dept: RADIOLOGY | Facility: MEDICAL CENTER | Age: 68
End: 2018-05-15
Attending: OBSTETRICS & GYNECOLOGY
Payer: MEDICARE

## 2018-05-15 DIAGNOSIS — Z12.31 SCREENING MAMMOGRAM, ENCOUNTER FOR: ICD-10-CM

## 2018-05-15 PROCEDURE — 77063 BREAST TOMOSYNTHESIS BI: CPT

## 2018-05-16 ENCOUNTER — TELEPHONE (OUTPATIENT)
Dept: MEDICAL GROUP | Facility: PHYSICIAN GROUP | Age: 68
End: 2018-05-16

## 2018-05-16 RX ORDER — LEVOTHYROXINE SODIUM 0.07 MG/1
75 TABLET ORAL
Qty: 90 TAB | Refills: 3 | Status: SHIPPED | OUTPATIENT
Start: 2018-05-16 | End: 2019-05-14 | Stop reason: SDUPTHER

## 2018-05-16 NOTE — TELEPHONE ENCOUNTER
Brand name only Was the patient seen in the last year in this department? Yes     Does patient have an active prescription for medications requested? No     Received Request Via: Pharmacy

## 2018-05-16 NOTE — TELEPHONE ENCOUNTER
If I order it, the insurance may not pay for the test.  There is no medical reason listed in her chart to do the test.    Chelsea Foy D.O.

## 2018-05-18 ENCOUNTER — TELEPHONE (OUTPATIENT)
Dept: MEDICAL GROUP | Facility: PHYSICIAN GROUP | Age: 68
End: 2018-05-18

## 2018-05-18 DIAGNOSIS — D70.9 NEUTROPENIA, UNSPECIFIED TYPE (HCC): ICD-10-CM

## 2018-05-18 NOTE — TELEPHONE ENCOUNTER
Lina would like to know if you can look at her cbc from 2014 and see if you think there is an issue that needs to be addressed because of her abnormalities. Thank you for looking into this.

## 2018-05-19 ENCOUNTER — HOSPITAL ENCOUNTER (OUTPATIENT)
Dept: LAB | Facility: MEDICAL CENTER | Age: 68
End: 2018-05-19
Attending: FAMILY MEDICINE
Payer: MEDICARE

## 2018-05-19 DIAGNOSIS — D70.9 NEUTROPENIA, UNSPECIFIED TYPE (HCC): ICD-10-CM

## 2018-05-19 LAB
BASOPHILS # BLD AUTO: 2.1 % (ref 0–1.8)
BASOPHILS # BLD: 0.11 K/UL (ref 0–0.12)
EOSINOPHIL # BLD AUTO: 0.06 K/UL (ref 0–0.51)
EOSINOPHIL NFR BLD: 1.2 % (ref 0–6.9)
ERYTHROCYTE [DISTWIDTH] IN BLOOD BY AUTOMATED COUNT: 49.3 FL (ref 35.9–50)
HCT VFR BLD AUTO: 40.4 % (ref 37–47)
HGB BLD-MCNC: 13.2 G/DL (ref 12–16)
IMM GRANULOCYTES # BLD AUTO: 0.02 K/UL (ref 0–0.11)
IMM GRANULOCYTES NFR BLD AUTO: 0.4 % (ref 0–0.9)
LYMPHOCYTES # BLD AUTO: 1.92 K/UL (ref 1–4.8)
LYMPHOCYTES NFR BLD: 37.5 % (ref 22–41)
MCH RBC QN AUTO: 30.1 PG (ref 27–33)
MCHC RBC AUTO-ENTMCNC: 32.7 G/DL (ref 33.6–35)
MCV RBC AUTO: 92 FL (ref 81.4–97.8)
MONOCYTES # BLD AUTO: 0.47 K/UL (ref 0–0.85)
MONOCYTES NFR BLD AUTO: 9.2 % (ref 0–13.4)
NEUTROPHILS # BLD AUTO: 2.54 K/UL (ref 2–7.15)
NEUTROPHILS NFR BLD: 49.6 % (ref 44–72)
NRBC # BLD AUTO: 0 K/UL
NRBC BLD-RTO: 0 /100 WBC
PLATELET # BLD AUTO: 281 K/UL (ref 164–446)
PMV BLD AUTO: 11.9 FL (ref 9–12.9)
RBC # BLD AUTO: 4.39 M/UL (ref 4.2–5.4)
WBC # BLD AUTO: 5.1 K/UL (ref 4.8–10.8)

## 2018-05-19 PROCEDURE — 36415 COLL VENOUS BLD VENIPUNCTURE: CPT

## 2018-05-19 PROCEDURE — 85025 COMPLETE CBC W/AUTO DIFF WBC: CPT

## 2018-06-01 NOTE — PROGRESS NOTES
1. Attempt #: 1    2. [facility] Verified: yes    3. Verify PCP: yes      5.  Reviewed/Updated the following with patient:       •   Communication Preference Obtained? YES    6. Quack Activation: already active    7. Quack Micheline: no    8. Annual Wellness Visit Scheduling  Scheduling Status:Not Scheduled. Patient states they see PCP regularly      9. Care Gap Scheduling (Attempt to Schedule EACH Overdue Care Gap!)     Health Maintenance Due   Topic Date Due   • IMM PNEUMOCOCCAL 65+ (ADULT) LOW/MEDIUM RISK SERIES (1 of 2 - PCV13) 04/21/2016        Patient prefers to discuss Immunizations: PNEUMOVAX (PPSV23) with PCP.    10. Patient was advised: “This is a free wellness visit. The provider will screen for medical conditions to help you stay healthy. If you have other concerns to address you may be asked to discuss these at a separate visit or there may be an additional fee.”

## 2018-07-24 ENCOUNTER — HOSPITAL ENCOUNTER (OUTPATIENT)
Dept: RADIOLOGY | Facility: MEDICAL CENTER | Age: 68
End: 2018-07-24
Attending: FAMILY MEDICINE
Payer: MEDICARE

## 2018-07-24 DIAGNOSIS — M85.89 OSTEOPENIA OF MULTIPLE SITES: ICD-10-CM

## 2018-07-24 PROCEDURE — 77080 DXA BONE DENSITY AXIAL: CPT

## 2018-10-24 ENCOUNTER — APPOINTMENT (RX ONLY)
Dept: URBAN - METROPOLITAN AREA CLINIC 22 | Facility: CLINIC | Age: 68
Setting detail: DERMATOLOGY
End: 2018-10-24

## 2018-10-24 DIAGNOSIS — D18.0 HEMANGIOMA: ICD-10-CM

## 2018-10-24 DIAGNOSIS — L81.4 OTHER MELANIN HYPERPIGMENTATION: ICD-10-CM

## 2018-10-24 DIAGNOSIS — L663 OTHER SPECIFIED DISEASES OF HAIR AND HAIR FOLLICLES: ICD-10-CM | Status: RESOLVING

## 2018-10-24 DIAGNOSIS — L73.9 FOLLICULAR DISORDER, UNSPECIFIED: ICD-10-CM | Status: RESOLVING

## 2018-10-24 DIAGNOSIS — L738 OTHER SPECIFIED DISEASES OF HAIR AND HAIR FOLLICLES: ICD-10-CM | Status: RESOLVING

## 2018-10-24 DIAGNOSIS — L82.1 OTHER SEBORRHEIC KERATOSIS: ICD-10-CM

## 2018-10-24 DIAGNOSIS — D22 MELANOCYTIC NEVI: ICD-10-CM

## 2018-10-24 DIAGNOSIS — D485 NEOPLASM OF UNCERTAIN BEHAVIOR OF SKIN: ICD-10-CM

## 2018-10-24 DIAGNOSIS — Z71.89 OTHER SPECIFIED COUNSELING: ICD-10-CM

## 2018-10-24 DIAGNOSIS — L82.0 INFLAMED SEBORRHEIC KERATOSIS: ICD-10-CM

## 2018-10-24 PROBLEM — D48.5 NEOPLASM OF UNCERTAIN BEHAVIOR OF SKIN: Status: ACTIVE | Noted: 2018-10-24

## 2018-10-24 PROBLEM — D22.5 MELANOCYTIC NEVI OF TRUNK: Status: ACTIVE | Noted: 2018-10-24

## 2018-10-24 PROBLEM — L02.32 FURUNCLE OF BUTTOCK: Status: ACTIVE | Noted: 2018-10-24

## 2018-10-24 PROBLEM — D18.01 HEMANGIOMA OF SKIN AND SUBCUTANEOUS TISSUE: Status: ACTIVE | Noted: 2018-10-24

## 2018-10-24 PROCEDURE — ? DEFER

## 2018-10-24 PROCEDURE — ? COUNSELING

## 2018-10-24 PROCEDURE — 99203 OFFICE O/P NEW LOW 30 MIN: CPT

## 2018-10-24 PROCEDURE — ? PHOTO-DOCUMENTATION

## 2018-10-24 ASSESSMENT — LOCATION DETAILED DESCRIPTION DERM
LOCATION DETAILED: MIDDLE STERNUM
LOCATION DETAILED: LEFT RIB CAGE
LOCATION DETAILED: RIGHT BUTTOCK
LOCATION DETAILED: LEFT MEDIAL UPPER BACK
LOCATION DETAILED: RIGHT DISTAL POSTERIOR THIGH
LOCATION DETAILED: LEFT ANTERIOR DISTAL THIGH
LOCATION DETAILED: LEFT SUPERIOR MEDIAL MALAR CHEEK
LOCATION DETAILED: EPIGASTRIC SKIN
LOCATION DETAILED: LEFT BUTTOCK
LOCATION DETAILED: NASAL ROOT
LOCATION DETAILED: UPPER STERNUM
LOCATION DETAILED: SUPERIOR THORACIC SPINE
LOCATION DETAILED: LEFT ULNAR DORSAL HAND

## 2018-10-24 ASSESSMENT — LOCATION SIMPLE DESCRIPTION DERM
LOCATION SIMPLE: RIGHT BUTTOCK
LOCATION SIMPLE: LEFT HAND
LOCATION SIMPLE: ABDOMEN
LOCATION SIMPLE: LEFT THIGH
LOCATION SIMPLE: LEFT BUTTOCK
LOCATION SIMPLE: NOSE
LOCATION SIMPLE: RIGHT POSTERIOR THIGH
LOCATION SIMPLE: LEFT CHEEK
LOCATION SIMPLE: UPPER BACK
LOCATION SIMPLE: LEFT UPPER BACK
LOCATION SIMPLE: CHEST

## 2018-10-24 ASSESSMENT — LOCATION ZONE DERM
LOCATION ZONE: LEG
LOCATION ZONE: HAND
LOCATION ZONE: NOSE
LOCATION ZONE: TRUNK
LOCATION ZONE: FACE

## 2018-10-24 NOTE — PROCEDURE: PHOTO-DOCUMENTATION
Detail Level: Detailed
Photo Preface (Leave Blank If You Do Not Want): Photographs were obtained today of chest.

## 2018-10-24 NOTE — PROCEDURE: COUNSELING
Detail Level: Detailed
Detail Level: Zone
Detail Level: Generalized
Patient Specific Counseling (Will Not Stick From Patient To Patient): Today not present\\nShares photo of cyst like lesion\\nWill monitor for recurrence

## 2018-10-24 NOTE — PROCEDURE: DEFER
Detail Level: Detailed
Instructions (Optional): Observation, if still present in 6 weeks schedule follow up.

## 2019-01-15 ENCOUNTER — OFFICE VISIT (OUTPATIENT)
Dept: URGENT CARE | Facility: PHYSICIAN GROUP | Age: 69
End: 2019-01-15
Payer: MEDICARE

## 2019-01-15 VITALS
WEIGHT: 136 LBS | DIASTOLIC BLOOD PRESSURE: 80 MMHG | RESPIRATION RATE: 16 BRPM | OXYGEN SATURATION: 97 % | SYSTOLIC BLOOD PRESSURE: 122 MMHG | TEMPERATURE: 97.9 F | BODY MASS INDEX: 22.66 KG/M2 | HEART RATE: 71 BPM | HEIGHT: 65 IN

## 2019-01-15 DIAGNOSIS — R68.84 JAW PAIN: ICD-10-CM

## 2019-01-15 PROCEDURE — 99213 OFFICE O/P EST LOW 20 MIN: CPT | Performed by: FAMILY MEDICINE

## 2019-01-15 ASSESSMENT — ENCOUNTER SYMPTOMS
NECK PAIN: 0
VOMITING: 0
RHINORRHEA: 0
SORE THROAT: 0

## 2019-01-15 NOTE — PATIENT INSTRUCTIONS
Otalgia  Otalgia is pain in or around the ear. When the pain is from the ear itself it is called primary otalgia. Pain may also be coming from somewhere else, like the head and neck. This is called secondary otalgia.   CAUSES   Causes of primary otalgia include:  · Middle ear infection.  · It can also be caused by injury to the ear or infection of the ear canal (swimmer's ear). Swimmer's ear causes pain, swelling and often drainage from the ear canal.  Causes of secondary otalgia include:  · Sinus infections.  · Allergies and colds that cause stuffiness of the nose and tubes that drain the ears (eustachian tubes).  · Dental problems like cavities, gum infections or teething.  · Sore Throat (tonsillitis and pharyngitis).  · Swollen glands in the neck.  · Infection of the bone behind the ear (mastoiditis).  · TMJ discomfort (problems with the joint between your jaw and your skull).  · Other problems such as nerve disorders, circulation problems, heart disease and tumors of the head and neck can also cause symptoms of ear pain. This is rare.  DIAGNOSIS   Evaluation, Diagnosis and Testing:  · Examination by your medical caregiver is recommended to evaluate and diagnose the cause of otalgia.  · Further testing or referral to a specialist may be indicated if the cause of the ear pain is not found and the symptom persists.  TREATMENT   · Your doctor may prescribe antibiotics if an ear infection is diagnosed.  · Pain relievers and topical analgesics may be recommended.  · It is important to take all medications as prescribed.  HOME CARE INSTRUCTIONS   · It may be helpful to sleep with the painful ear in the up position.  · A warm compress over the painful ear may provide relief.  · A soft diet and avoiding gum may help while ear pain is present.  SEEK IMMEDIATE MEDICAL CARE IF:  · You develop severe pain, a high fever, repeated vomiting or dehydration.  · You develop extreme dizziness, headache, confusion, ringing in the  ears (tinnitus) or hearing loss.  Document Released: 01/25/2006 Document Revised: 03/11/2013 Document Reviewed: 10/27/2010  [employer]® Patient Information ©2014 [employer], [employer].

## 2019-01-15 NOTE — PROGRESS NOTES
"Subjective:     Lina Freeman is a 68 y.o. female who presents for Otalgia (x 4 weeks// R ear )       Otalgia    There is pain in the right ear. This is a new problem. The current episode started 1 to 4 weeks ago. The problem occurs constantly. The problem has been waxing and waning. There has been no fever. The pain is mild. Pertinent negatives include no ear discharge, hearing loss, neck pain, rhinorrhea, sore throat or vomiting.     Review of Systems   HENT: Positive for ear pain. Negative for ear discharge, hearing loss, rhinorrhea and sore throat.    Gastrointestinal: Negative for vomiting.   Musculoskeletal: Negative for neck pain.     No Known Allergies   Objective:   /80 (BP Location: Left arm, Patient Position: Sitting)   Pulse 71   Temp 36.6 °C (97.9 °F) (Temporal)   Resp 16   Ht 1.659 m (5' 5.3\")   Wt 61.7 kg (136 lb)   LMP 01/01/2000   SpO2 97%   BMI 22.42 kg/m²   Physical Exam   Constitutional: She is oriented to person, place, and time. She appears well-developed and well-nourished. No distress.   HENT:   Head: Normocephalic and atraumatic.   Right Ear: Hearing, tympanic membrane and ear canal normal.   Right posterior angle of jaw tenderness   Eyes: Pupils are equal, round, and reactive to light. Conjunctivae and EOM are normal.   Cardiovascular: Normal rate and regular rhythm.    No murmur heard.  Pulmonary/Chest: Effort normal and breath sounds normal. No respiratory distress.   Neurological: She is alert and oriented to person, place, and time. She has normal reflexes. No sensory deficit.   Skin: Skin is warm and dry.   Psychiatric: She has a normal mood and affect.        Assessment/Plan:   1. Jaw pain  Differential diagnosis, natural history, supportive care, and indications for immediate follow-up discussed.  Use over-the-counter pain reliever, such as acetaminophen (Tylenol), ibuprofen (Advil, Motrin) or naproxen (Aleve) as needed; follow package directions for dosing. "

## 2019-01-22 ENCOUNTER — APPOINTMENT (RX ONLY)
Dept: URBAN - METROPOLITAN AREA CLINIC 36 | Facility: CLINIC | Age: 69
Setting detail: DERMATOLOGY
End: 2019-01-22

## 2019-01-22 DIAGNOSIS — Z41.9 ENCOUNTER FOR PROCEDURE FOR PURPOSES OTHER THAN REMEDYING HEALTH STATE, UNSPECIFIED: ICD-10-CM

## 2019-01-22 PROCEDURE — ? FACIAL

## 2019-01-22 ASSESSMENT — LOCATION DETAILED DESCRIPTION DERM: LOCATION DETAILED: RIGHT INFERIOR VERMILION LIP

## 2019-01-22 ASSESSMENT — LOCATION SIMPLE DESCRIPTION DERM: LOCATION SIMPLE: RIGHT LIP

## 2019-01-22 ASSESSMENT — LOCATION ZONE DERM: LOCATION ZONE: LIP

## 2019-01-22 NOTE — PROCEDURE: FACIAL
Exfoliation Type: enzyme peel
Facial Steaming: steamed
Mask Type (Optional): soft based
Treatment Type (Optional): Firming/Toning
Detail Level: Zone
Comments (Non-Sticky): Pt had gift certificate
Price (Use Numbers Only, No Special Characters Or $): $120.00

## 2019-05-07 ENCOUNTER — OFFICE VISIT (OUTPATIENT)
Dept: MEDICAL GROUP | Facility: MEDICAL CENTER | Age: 69
End: 2019-05-07
Payer: MEDICARE

## 2019-05-07 VITALS
HEART RATE: 72 BPM | SYSTOLIC BLOOD PRESSURE: 118 MMHG | TEMPERATURE: 97.5 F | BODY MASS INDEX: 22.77 KG/M2 | WEIGHT: 136.69 LBS | OXYGEN SATURATION: 99 % | DIASTOLIC BLOOD PRESSURE: 82 MMHG | HEIGHT: 65 IN

## 2019-05-07 DIAGNOSIS — E55.9 HYPOVITAMINOSIS D: ICD-10-CM

## 2019-05-07 DIAGNOSIS — Z12.31 ENCOUNTER FOR SCREENING MAMMOGRAM FOR MALIGNANT NEOPLASM OF BREAST: ICD-10-CM

## 2019-05-07 DIAGNOSIS — E03.9 HYPOTHYROIDISM (ACQUIRED): ICD-10-CM

## 2019-05-07 DIAGNOSIS — G25.0 ESSENTIAL TREMOR: ICD-10-CM

## 2019-05-07 DIAGNOSIS — M85.80 SENILE OSTEOPENIA: ICD-10-CM

## 2019-05-07 DIAGNOSIS — Z00.00 HEALTH CARE MAINTENANCE: ICD-10-CM

## 2019-05-07 DIAGNOSIS — E78.5 DYSLIPIDEMIA: ICD-10-CM

## 2019-05-07 DIAGNOSIS — Z76.89 ENCOUNTER TO ESTABLISH CARE: ICD-10-CM

## 2019-05-07 PROBLEM — R79.89 LOW SERUM VITAMIN D: Status: RESOLVED | Noted: 2018-05-11 | Resolved: 2019-05-07

## 2019-05-07 PROCEDURE — 99214 OFFICE O/P EST MOD 30 MIN: CPT | Performed by: INTERNAL MEDICINE

## 2019-05-07 ASSESSMENT — PATIENT HEALTH QUESTIONNAIRE - PHQ9: CLINICAL INTERPRETATION OF PHQ2 SCORE: 0

## 2019-05-07 NOTE — PROGRESS NOTES
CHIEF COMPLAINT  Chief Complaint   Patient presents with   • Establish Care     NEW 2 you   Hypothyroid    HPI  Patient is a 69 y.o. female patient who presents today for the following    Dyslipidemia  The patient is on omega-3, OTC.  Diet: healthy  Exercise: 3 d/w  BMI:  22  FH: neg    Hypothyroidism  Levothyroxine, 75 mcg, taking daily early in am, before any po intake.  No temperature intolerance. No change in weight,  hair/skin quality, BMs.   No tremors, weakness.  No peripheral swelling.  No mood changes.  FH: multiple    Essential tremor  Onset: her 20'  It has been in rest and activity, mostly R hand  - accompanied with head tremor.  Course: slightly worse, interfering with writing.  FH: multiple    Osteopenia, hypovitaminosis D  Dexa scan:   Vit D/Ca supplement: yes  Exercise: 3 d/w  Nonsmoker.   Vitamin D was low.  BMI: 22  FH f osteoporosis: neg   - osteopenia: sister    Reviewed PMH, PSH, FH, SH, ALL, HCM/IMM, no changes  Reviewed MEDS, no changes    Patient Active Problem List    Diagnosis Date Noted   • Hypovitaminosis D 05/07/2019   • Health care maintenance 05/07/2019   • Urge incontinence 05/11/2018   • Dyslipidemia 05/11/2018   • Benign skin lesion of nose 02/27/2018   • Essential tremor 02/26/2016   • Hypothyroidism (acquired)    • Senile osteopenia      CURRENT MEDICATIONS  Current Outpatient Prescriptions   Medication Sig Dispense Refill   • levothyroxine (SYNTHROID) 75 MCG Tab Take 1 Tab by mouth Every morning on an empty stomach. 90 Tab 3   • Multiple Vitamins-Minerals (MULTIVITAMIN ADULT) Tab Take 1 Tab by mouth every day.     • Calcium Carb-Cholecalciferol (CALCIUM 1000 + D PO) Take 1 Tab by mouth every day.     • Cholecalciferol (VITAMIN D-3 PO) Take 4,600 Units by mouth every day.     • B Complex-C (SUPER B COMPLEX PO) Take 1 Tab by mouth every day.     • Magnesium 400 MG Tab Take 1 Tab by mouth every day.     • Omega 3-6-9 Fatty Acids (TRIPLE OMEGA COMPLEX PO) Take 1 Tab by mouth every  "day.       No current facility-administered medications for this visit.      ALLERGIES  Allergies: Patient has no known allergies.  PAST MEDICAL HISTORY  Past Medical History:   Diagnosis Date   • Hyperlipidemia    • Hypothyroidism    • Osteopenia    • Osteopenia    • Tremor      SURGICAL HISTORY  She  has no past surgical history on file.  SOCIAL HISTORY  Social History   Substance Use Topics   • Smoking status: Former Smoker     Packs/day: 1.00     Years: 20.00     Types: Cigarettes     Quit date: 1989   • Smokeless tobacco: Never Used   • Alcohol use 1.0 oz/week     2 Cans of beer per week      Comment: 3-4 x week     Social History     Social History Narrative   • No narrative on file     FAMILY HISTORY  Family History   Problem Relation Age of Onset   • Cancer Mother         breast cancer   • Stroke Mother    • Hypertension Mother    • Breast Cancer Mother    • Cancer Father         lung cancer   • No Known Problems Sister    • No Known Problems Brother    • Cancer Maternal Grandmother         breast   • Breast Cancer Maternal Grandmother      Family Status   Relation Status   • Mo    • Fa    • Sis Alive   • Bro Alive   • MGMo      ROS   Constitutional: Negative for fatigue.  HENT: Negative for congestion, sore throat.  Eyes: Negative for blurred vision.   Respiratory: Negative for cough, shortness of breath.  Cardiovascular: Negative for chest pain, palpitations.   Gastrointestinal: Negative for heartburn, abdominal pain.   Genitourinary: Negative for dysuria.  Musculoskeletal: Negative for significant back pain.   Skin: Negative for rash.   Neuro: Negative for dizziness, headaches. And per HPI.  Endo/Heme/Allergies: Does not bruise/bleed easily.   Psychiatric/Behavioral: Negative for depression.    PHYSICAL EXAM   /82   Pulse 72   Temp 36.4 °C (97.5 °F) (Temporal)   Ht 1.659 m (5' 5.3\")   Wt 62 kg (136 lb 11 oz)   SpO2 99%  Body mass index is 22.54 kg/m².  General:  " NAD, well appearing  HEENT:   NC/AT, PERRLA, EOMI, TMs are clear. Oropharyngeal mucosa is pink,  without lesions;  no cervical / supraclavicular  lymphadenopathy, no thyromegaly.    Cardiovascular: RRR.   No m/r/g. No carotid bruits .      Lungs:   CTAB, no w/r/r, no respiratory distress.  Abdomen: Soft, NT/ND + BS; no hepatosplenomegaly.  Extremities:  2+ DP and radial pulses bilaterally.  No c/c/e.   Skin:  Warm, dry.  No erythema. No rash.   Neurologic: Alert & oriented x 3. CN II-XII grossly intact. No focal deficits.  Psychiatric:  Affect normal, mood normal, judgment normal.    LABS     Labs are reviewed and discussed with a patient  Lab Results   Component Value Date/Time    CHOLSTRLTOT 198 05/14/2018 07:13 AM    LDL 89 05/14/2018 07:13 AM    HDL 92 05/14/2018 07:13 AM    TRIGLYCERIDE 86 05/14/2018 07:13 AM       Lab Results   Component Value Date/Time    SODIUM 138 05/14/2018 07:13 AM    POTASSIUM 4.3 05/14/2018 07:13 AM    CHLORIDE 107 05/14/2018 07:13 AM    CO2 27 05/14/2018 07:13 AM    GLUCOSE 94 05/14/2018 07:13 AM    BUN 12 05/14/2018 07:13 AM    CREATININE 0.79 05/14/2018 07:13 AM     Lab Results   Component Value Date/Time    ALKPHOSPHAT 51 05/14/2018 07:13 AM    ASTSGOT 16 05/14/2018 07:13 AM    ALTSGPT 15 05/14/2018 07:13 AM    TBILIRUBIN 0.5 05/14/2018 07:13 AM       Ref. Range 5/14/2018    TSH  0.380 - 5.330 uIU/mL 0.900     Lab Results   Component Value Date/Time    FREET4 1.10 05/14/2018 07:13 AM    FREET4 1.09 02/03/2017 12:12 PM     Lab Results   Component Value Date/Time    WBC 5.1 05/19/2018 10:40 AM    RBC 4.39 05/19/2018 10:40 AM    HEMOGLOBIN 13.2 05/19/2018 10:40 AM    HEMATOCRIT 40.4 05/19/2018 10:40 AM    MCV 92.0 05/19/2018 10:40 AM    MCH 30.1 05/19/2018 10:40 AM    MCHC 32.7 (L) 05/19/2018 10:40 AM    MPV 11.9 05/19/2018 10:40 AM    NEUTSPOLYS 49.60 05/19/2018 10:40 AM    LYMPHOCYTES 37.50 05/19/2018 10:40 AM    MONOCYTES 9.20 05/19/2018 10:40 AM    EOSINOPHILS 1.20 05/19/2018 10:40  AM    BASOPHILS 2.10 (H) 05/19/2018 10:40 AM      IMAGING     Reviewed and discussed last DEXA scan from 7/24/2018  The mean bone mineral density for the lumbar spine is 0.993 g/cm2, which corresponds to a T score of -1.5 and a Z score of 0.1.    The proximal left femur has a mean bone mineral density of 0.769 g/cm2, with a T score of -1.9 and a Z score of -0.6.    When compared with the most recent study dated 7/22/2016, there has been a 2.6% decrease in the bone mineral density of the lumbar spine and a 1.9% increase in the bone mineral density of the left femur.    ASSESMENT AND PLAN       1. Dyslipidemia  Pending labs, continue current treatment  - Comp Metabolic Panel; Future  - Lipid Profile; Future    2. Hypothyroidism (acquired)  Pending labs, continue current treatment  - TSH; Future  - FREE THYROXINE; Future    3. Essential tremor  Gradually worsening, may require treatment    4. Senile osteopenia  Advised vitamin D and calcium daily supplements, daily weightbearing exercise    5. Hypovitaminosis D  Pending labs, advised 2000 units of vitamin D daily, OTC  - VITAMIN D,25 HYDROXY; Future    6. Health care maintenance  7. Encounter for screening mammogram for malignant neoplasm of breast  - MA-SCREEN MAMMO W/CAD-BILAT; Future    8. Encounter to establish care  Reviewed PMH, PSH, FH, SH, ALL, MEDS, HCM/IMM.     Counseling:   - Smoking:  Nonsmoker    Followup: Return in about 2 weeks (around 5/21/2019).    All questions are answered.    Please note that this dictation was created using voice recognition software, and that there might be errors of kathrine and possibly content.

## 2019-05-10 ENCOUNTER — HOSPITAL ENCOUNTER (OUTPATIENT)
Dept: LAB | Facility: MEDICAL CENTER | Age: 69
End: 2019-05-10
Attending: INTERNAL MEDICINE
Payer: MEDICARE

## 2019-05-10 DIAGNOSIS — E78.5 DYSLIPIDEMIA: ICD-10-CM

## 2019-05-10 DIAGNOSIS — E55.9 HYPOVITAMINOSIS D: ICD-10-CM

## 2019-05-10 DIAGNOSIS — E03.9 HYPOTHYROIDISM (ACQUIRED): ICD-10-CM

## 2019-05-10 LAB
25(OH)D3 SERPL-MCNC: 42 NG/ML (ref 30–100)
ALBUMIN SERPL BCP-MCNC: 4.3 G/DL (ref 3.2–4.9)
ALBUMIN/GLOB SERPL: 1.7 G/DL
ALP SERPL-CCNC: 54 U/L (ref 30–99)
ALT SERPL-CCNC: 18 U/L (ref 2–50)
ANION GAP SERPL CALC-SCNC: 8 MMOL/L (ref 0–11.9)
AST SERPL-CCNC: 16 U/L (ref 12–45)
BILIRUB SERPL-MCNC: 0.4 MG/DL (ref 0.1–1.5)
BUN SERPL-MCNC: 15 MG/DL (ref 8–22)
CALCIUM SERPL-MCNC: 9.5 MG/DL (ref 8.5–10.5)
CHLORIDE SERPL-SCNC: 104 MMOL/L (ref 96–112)
CHOLEST SERPL-MCNC: 216 MG/DL (ref 100–199)
CO2 SERPL-SCNC: 27 MMOL/L (ref 20–33)
CREAT SERPL-MCNC: 0.84 MG/DL (ref 0.5–1.4)
GLOBULIN SER CALC-MCNC: 2.5 G/DL (ref 1.9–3.5)
GLUCOSE SERPL-MCNC: 96 MG/DL (ref 65–99)
HDLC SERPL-MCNC: 96 MG/DL
LDLC SERPL CALC-MCNC: 105 MG/DL
POTASSIUM SERPL-SCNC: 4.4 MMOL/L (ref 3.6–5.5)
PROT SERPL-MCNC: 6.8 G/DL (ref 6–8.2)
SODIUM SERPL-SCNC: 139 MMOL/L (ref 135–145)
T4 FREE SERPL-MCNC: 1.19 NG/DL (ref 0.53–1.43)
TRIGL SERPL-MCNC: 76 MG/DL (ref 0–149)
TSH SERPL DL<=0.005 MIU/L-ACNC: 1.01 UIU/ML (ref 0.38–5.33)

## 2019-05-10 PROCEDURE — 36415 COLL VENOUS BLD VENIPUNCTURE: CPT

## 2019-05-10 PROCEDURE — 82306 VITAMIN D 25 HYDROXY: CPT

## 2019-05-10 PROCEDURE — 80053 COMPREHEN METABOLIC PANEL: CPT

## 2019-05-10 PROCEDURE — 80061 LIPID PANEL: CPT

## 2019-05-10 PROCEDURE — 84439 ASSAY OF FREE THYROXINE: CPT

## 2019-05-10 PROCEDURE — 84443 ASSAY THYROID STIM HORMONE: CPT

## 2019-05-14 ENCOUNTER — OFFICE VISIT (OUTPATIENT)
Dept: MEDICAL GROUP | Facility: MEDICAL CENTER | Age: 69
End: 2019-05-14
Payer: MEDICARE

## 2019-05-14 VITALS
SYSTOLIC BLOOD PRESSURE: 120 MMHG | OXYGEN SATURATION: 98 % | HEIGHT: 66 IN | HEART RATE: 62 BPM | WEIGHT: 136.69 LBS | BODY MASS INDEX: 21.97 KG/M2 | DIASTOLIC BLOOD PRESSURE: 64 MMHG | TEMPERATURE: 98.2 F

## 2019-05-14 DIAGNOSIS — E78.5 DYSLIPIDEMIA: ICD-10-CM

## 2019-05-14 DIAGNOSIS — E03.9 HYPOTHYROIDISM (ACQUIRED): ICD-10-CM

## 2019-05-14 DIAGNOSIS — R00.2 PALPITATIONS: ICD-10-CM

## 2019-05-14 PROCEDURE — 99214 OFFICE O/P EST MOD 30 MIN: CPT | Performed by: INTERNAL MEDICINE

## 2019-05-14 PROCEDURE — 93000 ELECTROCARDIOGRAM COMPLETE: CPT | Performed by: INTERNAL MEDICINE

## 2019-05-14 RX ORDER — LEVOTHYROXINE SODIUM 0.07 MG/1
75 TABLET ORAL
Qty: 90 TAB | Refills: 3 | Status: SHIPPED | OUTPATIENT
Start: 2019-05-14 | End: 2020-04-30

## 2019-05-14 RX ORDER — ZOSTER VACCINE RECOMBINANT, ADJUVANTED 50 MCG/0.5
KIT INTRAMUSCULAR
COMMUNITY
Start: 2019-03-01 | End: 2019-07-24

## 2019-05-14 NOTE — PROGRESS NOTES
CHIEF COMPLAINT  Palpitations    HPI  Patient is a 69 y.o. female patient who presents today for the following     Palpitations, dyslipidemia  68 y/o. F, h/o dyslipidemia, developed palpitations  · Onset: 2-3 weeks ago  · In rest and activity  · Lasting: sometimes for the whole day.    Denies:  - CP  - l lightheadedness, dizziness  - N, sweating  - Weakness  - Temperature intolerance. No change in hair/skin quality, BMs (TSH wnl).      Coffee intake: max 2 cups / d  FH of heart probms: neg.    Hypothyroidism  Levothyroxine, 75 mcg, taking daily early in am, before any po intake.  No temperature intolerance. No change in weight, hair/skin quality, BMs.   No tremors, weakness.  No peripheral swelling.  No mood changes.  FH: multiple    Reviewed PMH, PSH, FH, SH, ALL, HCM/IMM, no changes  Reviewed MEDS, no changes    Patient Active Problem List    Diagnosis Date Noted   • Hypovitaminosis D 05/07/2019   • Health care maintenance 05/07/2019   • Urge incontinence 05/11/2018   • Dyslipidemia 05/11/2018   • Benign skin lesion of nose 02/27/2018   • Essential tremor 02/26/2016   • Hypothyroidism (acquired)    • Senile osteopenia      CURRENT MEDICATIONS  Current Outpatient Prescriptions   Medication Sig Dispense Refill   • SHINGRIX 50 MCG/0.5ML Recon Susp      • levothyroxine (SYNTHROID) 75 MCG Tab Take 1 Tab by mouth Every morning on an empty stomach. 90 Tab 3   • Multiple Vitamins-Minerals (MULTIVITAMIN ADULT) Tab Take 1 Tab by mouth every day.     • Calcium Carb-Cholecalciferol (CALCIUM 1000 + D PO) Take 1 Tab by mouth every day.     • Cholecalciferol (VITAMIN D-3 PO) Take 4,600 Units by mouth every day.     • Magnesium 400 MG Tab Take 1 Tab by mouth every day.     • Omega 3-6-9 Fatty Acids (TRIPLE OMEGA COMPLEX PO) Take 1 Tab by mouth every day.     • B Complex-C (SUPER B COMPLEX PO) Take 1 Tab by mouth every day.       No current facility-administered medications for this visit.      ALLERGIES  Allergies: Patient has no  "known allergies.  PAST MEDICAL HISTORY  Past Medical History:   Diagnosis Date   • Hyperlipidemia    • Hypothyroidism    • Osteopenia    • Osteopenia    • Tremor      SURGICAL HISTORY  She  has no past surgical history on file.  SOCIAL HISTORY  Social History   Substance Use Topics   • Smoking status: Former Smoker     Packs/day: 1.00     Years: 20.00     Types: Cigarettes     Quit date: 1989   • Smokeless tobacco: Never Used   • Alcohol use 1.0 oz/week     2 Cans of beer per week      Comment: 3-4 x week     Social History     Social History Narrative   • No narrative on file     FAMILY HISTORY  Family History   Problem Relation Age of Onset   • Cancer Mother         breast cancer   • Stroke Mother    • Hypertension Mother    • Breast Cancer Mother    • Thyroid Mother    • Cancer Father         lung cancer   • Thyroid Sister    • No Known Problems Brother    • Cancer Maternal Grandmother         breast   • Breast Cancer Maternal Grandmother    • Thyroid Sister    • Thyroid Sister    • Hyperlipidemia Neg Hx    • Heart Disease Neg Hx      Family Status   Relation Status   • Mo    • Fa    • Sis Alive   • Bro Alive   • MGMo    • Sis (Not Specified)   • Sis (Not Specified)   • Neg Hx (Not Specified)     ROS   Constitutional: Negative for fatigue.  Eyes: Negative for blurred vision.   Respiratory: Negative for shortness of breath.  Cardiovascular: As above.  Gastrointestinal: Negative for heartburn, nausea, abdominal pain.   Musculoskeletal: No back pain   Neuro: Negative for dizziness, weakness and headaches.   Endo/Heme/Allergies: Does not bruise/bleed easily.   Psychiatric/Behavioral: Negative for depression, anxiety.    PHYSICAL EXAM   /64 (BP Location: Left arm, Patient Position: Sitting, BP Cuff Size: Adult)   Pulse 62   Temp 36.8 °C (98.2 °F) (Temporal)   Ht 1.673 m (5' 5.87\")   Wt 62 kg (136 lb 11 oz)   LMP 2000   SpO2 98%   BMI 22.15 kg/m²   General:  NAD, well " appearing  HEENT:   NC/AT, PERRLA, EOMI, TMs are clear,  no thyromegaly.    Cardiovascular: RRR.   No m/r/g. No carotid bruits .      Lungs:   CTAB, no w/r/r, no respiratory distress.  Abdomen: Soft, NT/ND + BS; no hepatosplenomegaly.  Extremities:  2+ DP and radial pulses bilaterally.  No c/c/e.   Skin:  Warm, dry.  No erythema. No rash.   Neurologic: Alert & oriented x 3. CN II-XII grossly intact.  No focal deficits.  Psychiatric:  Affect normal, mood normal, judgment normal.    EKG   By my interpretation today:  Rhythm: normal sinus   Rate: normal   Axis: normal   Ectopy: none   Conduction: normal   ST Segments: no acute change   T Waves: no acute change   Q Waves: none   Clinical Impression: no acute changes and normal EKG    LABS     Labs are reviewed and discussed with a patient  Lab Results   Component Value Date/Time    CHOLSTRLTOT 216 (H) 05/10/2019 07:30 AM     (H) 05/10/2019 07:30 AM    HDL 96 05/10/2019 07:30 AM    TRIGLYCERIDE 76 05/10/2019 07:30 AM       Lab Results   Component Value Date/Time    SODIUM 139 05/10/2019 07:30 AM    POTASSIUM 4.4 05/10/2019 07:30 AM    CHLORIDE 104 05/10/2019 07:30 AM    CO2 27 05/10/2019 07:30 AM    GLUCOSE 96 05/10/2019 07:30 AM    BUN 15 05/10/2019 07:30 AM    CREATININE 0.84 05/10/2019 07:30 AM     Lab Results   Component Value Date/Time    ALKPHOSPHAT 54 05/10/2019 07:30 AM    ASTSGOT 16 05/10/2019 07:30 AM    ALTSGPT 18 05/10/2019 07:30 AM    TBILIRUBIN 0.4 05/10/2019 07:30 AM      No results found for: HBA1C  No results found for: TSH  Lab Results   Component Value Date/Time    FREET4 1.19 05/10/2019 07:30 AM    FREET4 1.10 05/14/2018 07:13 AM     Lab Results   Component Value Date/Time    WBC 5.1 05/19/2018 10:40 AM    RBC 4.39 05/19/2018 10:40 AM    HEMOGLOBIN 13.2 05/19/2018 10:40 AM    HEMATOCRIT 40.4 05/19/2018 10:40 AM    MCV 92.0 05/19/2018 10:40 AM    MCH 30.1 05/19/2018 10:40 AM    MCHC 32.7 (L) 05/19/2018 10:40 AM    MPV 11.9 05/19/2018 10:40 AM     NEUTSPOLYS 49.60 05/19/2018 10:40 AM    LYMPHOCYTES 37.50 05/19/2018 10:40 AM    MONOCYTES 9.20 05/19/2018 10:40 AM    EOSINOPHILS 1.20 05/19/2018 10:40 AM    BASOPHILS 2.10 (H) 05/19/2018 10:40 AM        IMAGING     None    ASSESMENT AND PLAN        1. Palpitations  EKG was unremarkable, pending cardiology evaluation.  - EKG; Future  - aspirin EC (ECOTRIN) 81 MG Tablet Delayed Response; Take 1 Tab by mouth every day.  Dispense: 30 Tab  - Holter Monitor / Event Recorder; Future  - EC-ECHOCARDIOGRAM COMPLETE W/ CONT; Future  - REFERRAL TO CARDIOLOGY    2. Dyslipidemia  Slightly elevated cholesterol, high HDL.  Discussed about treatment options, prefers lifestyle modification with diet and exercise.  - Comp Metabolic Panel; Future  - Lipid Profile; Future    3. Hypothyroidism (acquired)  Controlled, continue current treatment  - TSH; Future  - FREE THYROXINE; Future      Counseling:   - Smoking:  Nonsmoker    Followup: Return if symptoms worsen or fail to improve.    All questions are answered.    Please note that this dictation was created using voice recognition software, and that there might be errors of kathrine and possibly content.

## 2019-05-21 ENCOUNTER — HOSPITAL ENCOUNTER (OUTPATIENT)
Dept: RADIOLOGY | Facility: MEDICAL CENTER | Age: 69
End: 2019-05-21
Attending: INTERNAL MEDICINE
Payer: MEDICARE

## 2019-05-21 DIAGNOSIS — Z12.31 VISIT FOR SCREENING MAMMOGRAM: ICD-10-CM

## 2019-05-21 PROCEDURE — 77063 BREAST TOMOSYNTHESIS BI: CPT

## 2019-05-28 ENCOUNTER — TELEPHONE (OUTPATIENT)
Dept: CARDIOLOGY | Facility: MEDICAL CENTER | Age: 69
End: 2019-05-28

## 2019-05-28 ENCOUNTER — NON-PROVIDER VISIT (OUTPATIENT)
Dept: CARDIOLOGY | Facility: MEDICAL CENTER | Age: 69
End: 2019-05-28
Payer: MEDICARE

## 2019-05-28 DIAGNOSIS — I47.10 SVT (SUPRAVENTRICULAR TACHYCARDIA): ICD-10-CM

## 2019-05-28 DIAGNOSIS — R00.2 PALPITATIONS: ICD-10-CM

## 2019-06-17 ENCOUNTER — TELEPHONE (OUTPATIENT)
Dept: CARDIOLOGY | Facility: MEDICAL CENTER | Age: 69
End: 2019-06-17

## 2019-06-24 ENCOUNTER — TELEPHONE (OUTPATIENT)
Dept: CARDIOLOGY | Facility: MEDICAL CENTER | Age: 69
End: 2019-06-24

## 2019-07-02 ENCOUNTER — HOSPITAL ENCOUNTER (OUTPATIENT)
Dept: CARDIOLOGY | Facility: MEDICAL CENTER | Age: 69
End: 2019-07-02
Attending: INTERNAL MEDICINE
Payer: MEDICARE

## 2019-07-02 ENCOUNTER — TELEPHONE (OUTPATIENT)
Dept: CARDIOLOGY | Facility: MEDICAL CENTER | Age: 69
End: 2019-07-02

## 2019-07-02 DIAGNOSIS — R00.2 PALPITATIONS: ICD-10-CM

## 2019-07-02 LAB
LV EJECT FRACT  99904: 65
LV EJECT FRACT MOD 2C 99903: 71.21
LV EJECT FRACT MOD 4C 99902: 74.95
LV EJECT FRACT MOD BP 99901: 74.2

## 2019-07-02 PROCEDURE — 93306 TTE W/DOPPLER COMPLETE: CPT

## 2019-07-02 PROCEDURE — 93306 TTE W/DOPPLER COMPLETE: CPT | Mod: 26 | Performed by: INTERNAL MEDICINE

## 2019-07-08 ENCOUNTER — TELEPHONE (OUTPATIENT)
Dept: CARDIOLOGY | Facility: MEDICAL CENTER | Age: 69
End: 2019-07-08

## 2019-07-10 PROCEDURE — 0298T PR EXT ECG > 48HR TO 21 DAY REVIEW AND INTERPRETATN: CPT | Performed by: INTERNAL MEDICINE

## 2019-07-10 PROCEDURE — 0296T PR EXT ECG > 48HR TO 21 DAY RCRD W/CONECT INTL RCRD: CPT | Performed by: INTERNAL MEDICINE

## 2019-07-24 ENCOUNTER — OFFICE VISIT (OUTPATIENT)
Dept: CARDIOLOGY | Facility: MEDICAL CENTER | Age: 69
End: 2019-07-24
Payer: MEDICARE

## 2019-07-24 VITALS
SYSTOLIC BLOOD PRESSURE: 110 MMHG | DIASTOLIC BLOOD PRESSURE: 80 MMHG | WEIGHT: 136 LBS | HEART RATE: 80 BPM | HEIGHT: 66 IN | BODY MASS INDEX: 21.86 KG/M2 | OXYGEN SATURATION: 100 %

## 2019-07-24 DIAGNOSIS — G25.0 ESSENTIAL TREMOR: ICD-10-CM

## 2019-07-24 DIAGNOSIS — E03.9 HYPOTHYROIDISM (ACQUIRED): ICD-10-CM

## 2019-07-24 DIAGNOSIS — R00.2 PALPITATIONS: ICD-10-CM

## 2019-07-24 DIAGNOSIS — I47.10 SVT (SUPRAVENTRICULAR TACHYCARDIA): ICD-10-CM

## 2019-07-24 PROCEDURE — 99203 OFFICE O/P NEW LOW 30 MIN: CPT | Performed by: INTERNAL MEDICINE

## 2019-07-24 NOTE — PROGRESS NOTES
Subjective:   Chief Complaint:   Chief Complaint   Patient presents with   • Palpitations       Lina Freeman is a 69 y.o. female who referred by Dr. Linton for palpitations.  She wore a Holter monitor and had 48 beats of nonsustained SVT, some PVCs and PACs.  It is not certain if she was feeling symptoms.    She had a week of palpitations, feels heart pound, then pause, then pound.    No family history of coronary artery disease.  Mother has hypertension.    She is not limited by chest pain, pressure or tightness.   No significant dyspnea on exertion, orthopnea or lower extremity swelling.   No dizziness, or presyncope/syncope.   No symptoms of leg claudication.   No stroke/TIA like symptoms.    Family tremor.  LDL is 105, HDL is very high at 96.    Retired from working in a bank.  Volunteers for [employer]  Moved here in 2012, son lives here, from [place] originally.    No HLP, no HTN, no CKD/RA/SLE, no DM.    Prior tobacco, quit at 39.      DATA REVIEWED by me:  ECG 5/14/2019  Sinus, rate 59, nonspecific intraventricular conduction delay    Echo 7/2/2019  Normal left ventricular size, thickness, systolic function, and diastolic function.  Left ventricular ejection fraction is visually estimated to be 65%.  Mild aortic insufficiency.  Estimated right ventricular systolic pressure  is 30 mmHg.    ZioPatch Summary:    1. Sinus rhythm with appropriate heart rate range.   2. Sinus node and AV node conduction normal. No pauses.  3. Rare PACs.  4. Rare PVCs.   5.  2 brief runs of of SVT, 4-8 beats. No sustained rhythm changes. No atrial fibrillation/flutter.  6.  One triggered event during sinus rhythm, no symptoms reported.    Conclusion: 2 brief runs of of SVT, 4-8 beats. No sustained rhythm changes. No atrial fibrillation/flutter.  One triggered event during sinus rhythm.    Elizabeth Dugan MD [facility]    Most recent labs:     5-10-19 , HDL 96, TG 76, , Na 139, K 4.4, cr 0.84, lfts normal    5-19-18 hgb 13.2,  plt 281    Past Medical History:   Diagnosis Date   • Hyperlipidemia    • Hypothyroidism    • Osteopenia    • Osteopenia    • Tremor      History reviewed. No pertinent surgical history.  Family History   Problem Relation Age of Onset   • Cancer Mother         breast cancer   • Stroke Mother         Probably mini strokes   • Hypertension Mother         Rheumatic fever   • Breast Cancer Mother    • Thyroid Mother    • Cancer Father         lung cancer   • Thyroid Sister    • No Known Problems Brother    • Cancer Maternal Grandmother         breast   • Breast Cancer Maternal Grandmother    • Thyroid Sister    • Thyroid Sister    • Hyperlipidemia Neg Hx    • Heart Disease Neg Hx      Social History     Social History   • Marital status:      Spouse name: N/A   • Number of children: N/A   • Years of education: N/A     Occupational History   • Not on file.     Social History Main Topics   • Smoking status: Former Smoker     Packs/day: 1.00     Years: 20.00     Types: Cigarettes     Quit date: 2/26/1989   • Smokeless tobacco: Never Used   • Alcohol use 1.0 oz/week     2 Cans of beer per week      Comment: 3-4 x week   • Drug use: No   • Sexual activity: Not Currently      Comment: maried, 2 kids, none     Other Topics Concern   • Not on file     Social History Narrative   • No narrative on file     No Known Allergies    Current Outpatient Prescriptions   Medication Sig Dispense Refill   • levothyroxine (SYNTHROID) 75 MCG Tab Take 1 Tab by mouth Every morning on an empty stomach. 90 Tab 3   • Multiple Vitamins-Minerals (MULTIVITAMIN ADULT) Tab Take 1 Tab by mouth every day.     • Calcium Carb-Cholecalciferol (CALCIUM 1000 + D PO) Take 1 Tab by mouth every day.     • Cholecalciferol (VITAMIN D-3 PO) Take 4,600 Units by mouth every day.     • Magnesium 400 MG Tab Take 1 Tab by mouth every day.     • Omega 3-6-9 Fatty Acids (TRIPLE OMEGA COMPLEX PO) Take 1 Tab by mouth every day.     • B Complex-C (SUPER B COMPLEX PO)  "Take 1 Tab by mouth every day.       No current facility-administered medications for this visit.        ROS  All others systems reviewed and negative.     Objective:     /80   Pulse 80   Ht 1.676 m (5' 6\")   Wt 61.7 kg (136 lb)   SpO2 100%  Body mass index is 21.95 kg/m².    Physical Exam   General: No acute distress. Well nourished.  HEENT: EOM grossly intact, no scleral icterus, no pharyngeal erythema.   Neck:  No JVD, no bruits, trachea midline  CVS: RRR. Normal S1, S2. No M/R/G. No LE edema.  2+ radial pulses, 2+ DP pulses  Resp: CTAB. No wheezing or crackles/rhonchi. Normal respiratory effort.  Abdomen: Soft, NT, no wiliam hepatomegaly.  MSK/Ext: No clubbing or cyanosis.  Skin: Warm and dry, no rashes.  Neurological: CN III-XII grossly intact. No focal deficits.   Psych: A&O x 3, appropriate affect, good judgement      Assessment:     1. Palpitations     2. SVT (supraventricular tachycardia) (HCC)     3. Hypothyroidism (acquired)     4. Essential tremor         Medical Decision Making:  Today's Assessment / Status / Plan:     -Brief, nonsustained SVT, not symptomatic  -Given her very low risk factors I do not think she needs primary prevention ASA or statin therapy  -She mostly describes symptomatic PVCs, I don't think she felt the brief atrial runs.  Her left atrium size is normal, risk of afib if low at this point.  -I would ask her to contact me if she has sustained racing, more than 1 minute  -If the palpitations become bothersome, consider BB therapy to suppress which might also help tremors  -Consider self monitoring in the future if these become more frequent, such as [employer] patric.  -RTC as needed for now, she will let Dr. Linton know to get a hold of me if anything changes.    Written instructions given today:    -Consider a beta blocker medication that might help suppress palpitations and help with tremors.      Return if symptoms worsen or fail to improve.    It is my pleasure to " participate in the care of Ms. Freeman.  Please do not hesitate to contact me with questions or concerns.    Elizabeth Dugan MD, [facility]  Cardiologist [facility]    Please note that this dictation was created using voice recognition software. I have made every reasonable attempt to correct obvious errors, but it is possible there are errors of grammar and possibly content that I did not discover before finalizing the note.

## 2019-07-24 NOTE — LETTER
[facility]   [address]   [address]  [place] [address]  Phone: [phone]  Fax: [phone]              Lina Freeman  1950    Encounter Date: 7/24/2019    Elizabeth Dugan M.D.          PROGRESS NOTE:  Subjective:   Chief Complaint:   Chief Complaint   Patient presents with   • Palpitations       Lina Freeman is a 69 y.o. female who referred by Dr. Linton for palpitations.  She wore a Holter monitor and had 48 beats of nonsustained SVT, some PVCs and PACs.  It is not certain if she was feeling symptoms.    She had a week of palpitations, feels heart pound, then pause, then pound.    No family history of coronary artery disease.  Mother has hypertension.    She is not limited by chest pain, pressure or tightness.   No significant dyspnea on exertion, orthopnea or lower extremity swelling.   No dizziness, or presyncope/syncope.   No symptoms of leg claudication.   No stroke/TIA like symptoms.    Family tremor.  LDL is 105, HDL is very high at 96.    Retired from working in a bank.  Volunteers for [employer]  Moved here in 2012, son lives here, from [place] originally.    No HLP, no HTN, no CKD/RA/SLE, no DM.    Prior tobacco, quit at 39.      DATA REVIEWED by me:  ECG 5/14/2019  Sinus, rate 59, nonspecific intraventricular conduction delay    Echo 7/2/2019  Normal left ventricular size, thickness, systolic function, and diastolic function.  Left ventricular ejection fraction is visually estimated to be 65%.  Mild aortic insufficiency.  Estimated right ventricular systolic pressure  is 30 mmHg.    ZioPatch Summary:    1. Sinus rhythm with appropriate heart rate range.   2. Sinus node and AV node conduction normal. No pauses.  3. Rare PACs.  4. Rare PVCs.   5.  2 brief runs of of SVT, 4-8 beats. No sustained rhythm changes. No atrial fibrillation/flutter.  6.  One triggered event during sinus rhythm, no symptoms  reported.    Conclusion: 2 brief runs of of SVT, 4-8 beats. No sustained rhythm changes. No atrial fibrillation/flutter.  One triggered event during sinus rhythm.    Elizabeth Dugan MD [facility]    Most recent labs:     5-10-19 , HDL 96, TG 76, , Na 139, K 4.4, cr 0.84, lfts normal    5-19-18 hgb 13.2, plt 281    Past Medical History:   Diagnosis Date   • Hyperlipidemia    • Hypothyroidism    • Osteopenia    • Osteopenia    • Tremor      History reviewed. No pertinent surgical history.  Family History   Problem Relation Age of Onset   • Cancer Mother         breast cancer   • Stroke Mother         Probably mini strokes   • Hypertension Mother         Rheumatic fever   • Breast Cancer Mother    • Thyroid Mother    • Cancer Father         lung cancer   • Thyroid Sister    • No Known Problems Brother    • Cancer Maternal Grandmother         breast   • Breast Cancer Maternal Grandmother    • Thyroid Sister    • Thyroid Sister    • Hyperlipidemia Neg Hx    • Heart Disease Neg Hx      Social History     Social History   • Marital status:      Spouse name: N/A   • Number of children: N/A   • Years of education: N/A     Occupational History   • Not on file.     Social History Main Topics   • Smoking status: Former Smoker     Packs/day: 1.00     Years: 20.00     Types: Cigarettes     Quit date: 2/26/1989   • Smokeless tobacco: Never Used   • Alcohol use 1.0 oz/week     2 Cans of beer per week      Comment: 3-4 x week   • Drug use: No   • Sexual activity: Not Currently      Comment: maried, 2 kids, none     Other Topics Concern   • Not on file     Social History Narrative   • No narrative on file     No Known Allergies    Current Outpatient Prescriptions   Medication Sig Dispense Refill   • levothyroxine (SYNTHROID) 75 MCG Tab Take 1 Tab by mouth Every morning on an empty stomach. 90 Tab 3   • Multiple Vitamins-Minerals (MULTIVITAMIN ADULT) Tab Take 1 Tab by mouth every day.     • Calcium Carb-Cholecalciferol  "(CALCIUM 1000 + D PO) Take 1 Tab by mouth every day.     • Cholecalciferol (VITAMIN D-3 PO) Take 4,600 Units by mouth every day.     • Magnesium 400 MG Tab Take 1 Tab by mouth every day.     • Omega 3-6-9 Fatty Acids (TRIPLE OMEGA COMPLEX PO) Take 1 Tab by mouth every day.     • B Complex-C (SUPER B COMPLEX PO) Take 1 Tab by mouth every day.       No current facility-administered medications for this visit.        ROS  All others systems reviewed and negative.     Objective:     /80   Pulse 80   Ht 1.676 m (5' 6\")   Wt 61.7 kg (136 lb)   SpO2 100%  Body mass index is 21.95 kg/m².    Physical Exam   General: No acute distress. Well nourished.  HEENT: EOM grossly intact, no scleral icterus, no pharyngeal erythema.   Neck:  No JVD, no bruits, trachea midline  CVS: RRR. Normal S1, S2. No M/R/G. No LE edema.  2+ radial pulses, 2+ DP pulses  Resp: CTAB. No wheezing or crackles/rhonchi. Normal respiratory effort.  Abdomen: Soft, NT, no wiliam hepatomegaly.  MSK/Ext: No clubbing or cyanosis.  Skin: Warm and dry, no rashes.  Neurological: CN III-XII grossly intact. No focal deficits.   Psych: A&O x 3, appropriate affect, good judgement      Assessment:     1. Palpitations     2. SVT (supraventricular tachycardia) (HCC)     3. Hypothyroidism (acquired)     4. Essential tremor         Medical Decision Making:  Today's Assessment / Status / Plan:     -Brief, nonsustained SVT, not symptomatic  -Given her very low risk factors I do not think she needs primary prevention ASA or statin therapy  -She mostly describes symptomatic PVCs, I don't think she felt the brief atrial runs.  Her left atrium size is normal, risk of afib if low at this point.  -I would ask her to contact me if she has sustained racing, more than 1 minute  -If the palpitations become bothersome, consider BB therapy to suppress which might also help tremors  -Consider self monitoring in the future if these become more frequent, such as [employer] " patric.  -RTC as needed for now, she will let Dr. Linton know to get a hold of me if anything changes.    Written instructions given today:    -Consider a beta blocker medication that might help suppress palpitations and help with tremors.      Return if symptoms worsen or fail to improve.    It is my pleasure to participate in the care of Ms. Freeman.  Please do not hesitate to contact me with questions or concerns.    Elizabeth Dugan MD, [facility]  Cardiologist [facility]    Please note that this dictation was created using voice recognition software. I have made every reasonable attempt to correct obvious errors, but it is possible there are errors of grammar and possibly content that I did not discover before finalizing the note.      Rosmery Flaherty M.D.  [address]  [address]  [place] [address]  VIA In Basket

## 2019-12-09 ENCOUNTER — APPOINTMENT (RX ONLY)
Dept: URBAN - METROPOLITAN AREA CLINIC 22 | Facility: CLINIC | Age: 69
Setting detail: DERMATOLOGY
End: 2019-12-09

## 2019-12-09 DIAGNOSIS — D18.0 HEMANGIOMA: ICD-10-CM

## 2019-12-09 DIAGNOSIS — L81.4 OTHER MELANIN HYPERPIGMENTATION: ICD-10-CM

## 2019-12-09 DIAGNOSIS — D22 MELANOCYTIC NEVI: ICD-10-CM

## 2019-12-09 DIAGNOSIS — Z71.89 OTHER SPECIFIED COUNSELING: ICD-10-CM

## 2019-12-09 DIAGNOSIS — L91.8 OTHER HYPERTROPHIC DISORDERS OF THE SKIN: ICD-10-CM

## 2019-12-09 PROBLEM — D22.5 MELANOCYTIC NEVI OF TRUNK: Status: ACTIVE | Noted: 2019-12-09

## 2019-12-09 PROBLEM — D18.01 HEMANGIOMA OF SKIN AND SUBCUTANEOUS TISSUE: Status: ACTIVE | Noted: 2019-12-09

## 2019-12-09 PROCEDURE — 99214 OFFICE O/P EST MOD 30 MIN: CPT

## 2019-12-09 PROCEDURE — ? COUNSELING

## 2019-12-09 ASSESSMENT — LOCATION SIMPLE DESCRIPTION DERM
LOCATION SIMPLE: UPPER BACK
LOCATION SIMPLE: LEFT BREAST
LOCATION SIMPLE: INFERIOR FOREHEAD
LOCATION SIMPLE: LEFT FOREARM
LOCATION SIMPLE: CHEST
LOCATION SIMPLE: RIGHT FOREARM
LOCATION SIMPLE: RIGHT BREAST

## 2019-12-09 ASSESSMENT — LOCATION DETAILED DESCRIPTION DERM
LOCATION DETAILED: INFERIOR MID FOREHEAD
LOCATION DETAILED: LEFT MEDIAL BREAST 6-7:00 REGION
LOCATION DETAILED: LEFT VENTRAL PROXIMAL FOREARM
LOCATION DETAILED: RIGHT VENTRAL PROXIMAL FOREARM
LOCATION DETAILED: SUPERIOR THORACIC SPINE
LOCATION DETAILED: RIGHT INFRAMAMMARY CREASE (INNER QUADRANT)
LOCATION DETAILED: INFERIOR THORACIC SPINE
LOCATION DETAILED: LOWER STERNUM

## 2019-12-09 ASSESSMENT — LOCATION ZONE DERM
LOCATION ZONE: FACE
LOCATION ZONE: TRUNK
LOCATION ZONE: ARM

## 2019-12-17 ENCOUNTER — APPOINTMENT (RX ONLY)
Dept: URBAN - METROPOLITAN AREA CLINIC 22 | Facility: CLINIC | Age: 69
Setting detail: DERMATOLOGY
End: 2019-12-17

## 2019-12-17 DIAGNOSIS — Z41.9 ENCOUNTER FOR PROCEDURE FOR PURPOSES OTHER THAN REMEDYING HEALTH STATE, UNSPECIFIED: ICD-10-CM

## 2019-12-17 PROCEDURE — ? ADDITIONAL NOTES

## 2019-12-17 NOTE — PROCEDURE: ADDITIONAL NOTES
Detail Level: Simple
Additional Notes: Cindy had consult and facial with me last year. She is now finally coming in for possible laser treatment and still has her packet from last year. I told her we would honor rosario from last year. BBL full face add on neck. Need to share with her info on Jose friend for threading lower face.  Advised her to add on her neck and shared it would be another $100 per tx to add on the neck. Was very clear with her regarding expectations as she does not have a lot of surface pigmentation. She does have telangiectasias on and around nose possibly need to tx its Vbeam.

## 2020-01-03 ENCOUNTER — APPOINTMENT (RX ONLY)
Dept: URBAN - METROPOLITAN AREA CLINIC 20 | Facility: CLINIC | Age: 70
Setting detail: DERMATOLOGY
End: 2020-01-03

## 2020-01-03 DIAGNOSIS — Z41.9 ENCOUNTER FOR PROCEDURE FOR PURPOSES OTHER THAN REMEDYING HEALTH STATE, UNSPECIFIED: ICD-10-CM

## 2020-01-03 PROCEDURE — ? ADDITIONAL NOTES

## 2020-01-03 PROCEDURE — ? SCITON BBL

## 2020-01-03 ASSESSMENT — LOCATION DETAILED DESCRIPTION DERM
LOCATION DETAILED: LEFT SUPERIOR MEDIAL BUCCAL CHEEK
LOCATION DETAILED: LEFT INFERIOR CENTRAL MALAR CHEEK
LOCATION DETAILED: LEFT SUPERIOR ANTERIOR NECK

## 2020-01-03 ASSESSMENT — LOCATION SIMPLE DESCRIPTION DERM
LOCATION SIMPLE: LEFT ANTERIOR NECK
LOCATION SIMPLE: LEFT CHEEK

## 2020-01-03 ASSESSMENT — LOCATION ZONE DERM
LOCATION ZONE: NECK
LOCATION ZONE: FACE

## 2020-01-03 NOTE — PROCEDURE: ADDITIONAL NOTES
Additional Notes: Recommended .5 retinol and blueberry soy night cream from [facility].
Detail Level: Simple

## 2020-01-03 NOTE — PROCEDURE: SCITON BBL
Pulse Duration: 20
Cooling ?: Yes
Indication Override (Will Not Show Above If Text Entered): vascular
Passes: 1
Hide Repetition Rate?: No
Spot Size: Finesse Adapter Size: 15 x 45 mm (No Finesse Adapter)
Pulse Duration: 10
Pulse Duration Units: milliseconds
Anesthesia Volume In Cc: 0
Additional Comments (Optional): same setting with 15x15 square
Location Override (Will Not Show Above If Text Entered): spot treatment
Cooling (In C): 22
Preprocedure Text: The treatment areas were thoroughly cleaned. Clear ultrasound gel was applied to the treatment area. The area was treated with no immediate stacking of pulses.
Fluence (J/Cm2): 25
Post Procedure Text: The patient tolerated the procedure well. Post care was reviewed with the patient.
Pulse Duration: 15
Fluence (J/Cm2): 12
Detail Level: Zone
Spot Size: Finesse Adapter Size: 7 mm round
Consent: Written consent obtained, risks reviewed including but not limited to crusting, scabbing, blistering, scarring, darker or lighter pigmentary change, bruising, and/or incomplete response.
Price (Use Numbers Only, No Special Characters Or $): 400.00
Fluence (J/Cm2): 8
Post-Care Instructions: I reviewed with the patient in detail post-care instructions. Patient should stay away from the sun and wear sun protection until treated areas are fully healed.
Spot Size: Finesse Adapter Size: 15 x 15 mm square

## 2020-02-14 ENCOUNTER — APPOINTMENT (RX ONLY)
Dept: URBAN - METROPOLITAN AREA CLINIC 20 | Facility: CLINIC | Age: 70
Setting detail: DERMATOLOGY
End: 2020-02-14

## 2020-02-14 DIAGNOSIS — Z41.9 ENCOUNTER FOR PROCEDURE FOR PURPOSES OTHER THAN REMEDYING HEALTH STATE, UNSPECIFIED: ICD-10-CM

## 2020-02-14 PROCEDURE — ? SCITON BBL

## 2020-02-14 PROCEDURE — ? PULSED-DYE LASER

## 2020-02-14 ASSESSMENT — LOCATION ZONE DERM: LOCATION ZONE: FACE

## 2020-02-14 ASSESSMENT — LOCATION SIMPLE DESCRIPTION DERM: LOCATION SIMPLE: LEFT CHEEK

## 2020-02-14 ASSESSMENT — LOCATION DETAILED DESCRIPTION DERM: LOCATION DETAILED: LEFT INFERIOR CENTRAL MALAR CHEEK

## 2020-02-14 NOTE — PROCEDURE: PULSED-DYE LASER
Consent: Written consent obtained, risks reviewed including but not limited to crusting, scabbing, blistering, scarring, darker or lighter pigmentary change, incidental hair removal, bruising, and/or incomplete removal.
Laser Type: Vbeam Perfecta 595nm
Delay Time (Ms): 20
Cryogen Time (Ms): 30
Pulse Duration: 40 ms
Treated Area: small area
Pulse Duration: 10 ms
Treated Area: medium area
Spot Size: 5 mm
Post-Care Instructions: I reviewed with the patient in detail post-care instructions. Patient should stay away from the sun and wear sun protection until treated areas are fully healed.
Location (Required For Details To Render In Note But Body Touch Will Also Count For First Location): nose
Spot Size: 10 mm
Fluence In J/Cm2 (Optional): 8.00
Post-Procedure Care: Post care reviewed with patient.
Pulse Duration: 1.5 ms
Fluence In J/Cm2 (Optional): 7.50
Price (Use Numbers Only, No Special Characters Or $): 0.00
Immediate Endpoint: erythema
Detail Level: Zone
Fluence In J/Cm2 (Optional): 5
Fluence In J/Cm2 (Optional): 5.5

## 2020-02-14 NOTE — PROCEDURE: SCITON BBL
Spot Size: Finesse Adapter Size: 15 x 15 mm square
Post-Care Instructions: I reviewed with the patient in detail post-care instructions. Patient should stay away from the sun and wear sun protection until treated areas are fully healed.
Detail Level: Zone
Cooling ?: Yes
Consent: Written consent obtained, risks reviewed including but not limited to crusting, scabbing, blistering, scarring, darker or lighter pigmentary change, bruising, and/or incomplete response.
Fluence (J/Cm2): 12
Hide Repetition Rate?: No
Spot Size: Finesse Adapter Size: 15 x 45 mm (No Finesse Adapter)
Passes: 1
Cooling (In C): 15
Pulse Duration: 10
Pulse Duration Units: milliseconds
Cooling (In C): 25
Price (Use Numbers Only, No Special Characters Or $): 350.00
Location Override (Will Not Show Above If Text Entered): spot treatment
Anesthesia Volume In Cc: 0
Fluence (J/Cm2): 14
Treatment Number: 2
Spot Size: Finesse Adapter Size: 7 mm round
Preprocedure Text: The treatment areas were thoroughly cleaned. Clear ultrasound gel was applied to the treatment area. The area was treated with no immediate stacking of pulses.
Pulse Duration: 20
Post Procedure Text: The patient tolerated the procedure well. Post care was reviewed with the patient.

## 2020-03-20 ENCOUNTER — APPOINTMENT (RX ONLY)
Dept: URBAN - METROPOLITAN AREA CLINIC 20 | Facility: CLINIC | Age: 70
Setting detail: DERMATOLOGY
End: 2020-03-20

## 2020-03-20 DIAGNOSIS — Z41.9 ENCOUNTER FOR PROCEDURE FOR PURPOSES OTHER THAN REMEDYING HEALTH STATE, UNSPECIFIED: ICD-10-CM

## 2020-03-20 PROCEDURE — ? SCITON BBL

## 2020-03-20 ASSESSMENT — LOCATION DETAILED DESCRIPTION DERM: LOCATION DETAILED: LEFT INFERIOR CENTRAL MALAR CHEEK

## 2020-03-20 ASSESSMENT — LOCATION ZONE DERM: LOCATION ZONE: FACE

## 2020-03-20 ASSESSMENT — LOCATION SIMPLE DESCRIPTION DERM: LOCATION SIMPLE: LEFT CHEEK

## 2020-03-20 NOTE — PROCEDURE: SCITON BBL
Cooling (In C): 20
Anesthesia Volume In Cc: 0
Fluence (J/Cm2): 25
Repetition Rate (Hz): 1
Pulse Duration Units: milliseconds
Treatment Number: 3
Location Override (Will Not Show Above If Text Entered): spot treatment
Passes: 2
Cooling ?: Yes
Pulse Duration: 30
Preprocedure Text: The treatment areas were thoroughly cleaned. Clear ultrasound gel was applied to the treatment area. The area was treated with no immediate stacking of pulses.
Fluence (J/Cm2): 14
Indication Override (Will Not Show Above If Text Entered): vascular
Post Procedure Text: The patient tolerated the procedure well. Post care was reviewed with the patient.
Additional Comments (Optional): same setting with 15x15 square
Detail Level: Zone
Consent: Written consent obtained, risks reviewed including but not limited to crusting, scabbing, blistering, scarring, darker or lighter pigmentary change, bruising, and/or incomplete response.
Spot Size: Finesse Adapter Size: 15 x 45 mm (No Finesse Adapter)
Spot Size: Finesse Adapter Size: 7 mm round
Post-Care Instructions: I reviewed with the patient in detail post-care instructions. Patient should stay away from the sun and wear sun protection until treated areas are fully healed.
Fluence (J/Cm2): 12
Spot Size: Finesse Adapter Size: 15 x 15 mm square
Hide Repetition Rate?: No
Pulse Duration: 10
Price (Use Numbers Only, No Special Characters Or $): 177.50
Cooling (In C): 15

## 2020-04-21 ENCOUNTER — PATIENT OUTREACH (OUTPATIENT)
Dept: HEALTH INFORMATION MANAGEMENT | Facility: OTHER | Age: 70
End: 2020-04-21

## 2020-04-23 ENCOUNTER — APPOINTMENT (RX ONLY)
Dept: URBAN - METROPOLITAN AREA CLINIC 22 | Facility: CLINIC | Age: 70
Setting detail: DERMATOLOGY
End: 2020-04-23

## 2020-04-23 PROBLEM — D48.5 NEOPLASM OF UNCERTAIN BEHAVIOR OF SKIN: Status: ACTIVE | Noted: 2020-04-23

## 2020-04-23 PROCEDURE — ? BIOPSY BY SHAVE METHOD

## 2020-04-23 PROCEDURE — 11102 TANGNTL BX SKIN SINGLE LES: CPT

## 2020-04-23 NOTE — HPI: SKIN LESION
Is This A New Presentation, Or A Follow-Up?: Skin Lesion
What Type Of Note Output Would You Prefer (Optional)?: Standard Output
How Severe Is Your Skin Lesion?: moderate
Has Your Skin Lesion Been Treated?: not been treated
Additional History: Patient is concerned that it could be cancerous.

## 2020-04-23 NOTE — PROCEDURE: BIOPSY BY SHAVE METHOD
Detail Level: Detailed
Depth Of Biopsy: dermis
Was A Bandage Applied: Yes
Size Of Lesion In Cm: 0
Biopsy Type: H and E
Biopsy Method: Personna blade
Anesthesia Type: 1% lidocaine with 1:100,000 epinephrine and a 1:10 solution of 8.4% sodium bicarbonate
Anesthesia Volume In Cc: 1
Hemostasis: Drysol
Wound Care: Petrolatum
Dressing: pressure dressing with telfa
Destruction After The Procedure: No
Type Of Destruction Used: Curettage
Curettage Text: The wound bed was treated with curettage after the biopsy was performed.
Cryotherapy Text: The wound bed was treated with cryotherapy after the biopsy was performed.
Electrodesiccation Text: The wound bed was treated with electrodesiccation after the biopsy was performed.
Electrodesiccation And Curettage Text: The wound bed was treated with electrodesiccation and curettage after the biopsy was performed.
Silver Nitrate Text: The wound bed was treated with silver nitrate after the biopsy was performed.
Lab: [id number]
Lab Facility: 
Consent: Written consent was obtained and risks were reviewed including but not limited to scarring, infection, bleeding, scabbing, incomplete removal, nerve damage and allergy to anesthesia.
Post-Care Instructions: I reviewed with the patient in detail post-care instructions. Patient is to keep the biopsy site dry overnight. Gentle cleansing daily.  Apply petroleum ointment daily until healed. Patient may apply hydrogen peroxide soaks to remove any crusting.
Notification Instructions: Patient will be notified of biopsy results. However, patient instructed to call the office if not contacted within 2 weeks.
Billing Type: Third-Party Bill
Information: Selecting Yes will display possible errors in your note based on the variables you have selected. This validation is only offered as a suggestion for you. PLEASE NOTE THAT THE VALIDATION TEXT WILL BE REMOVED WHEN YOU FINALIZE YOUR NOTE. IF YOU WANT TO FAX A PRELIMINARY NOTE YOU WILL NEED TO TOGGLE THIS TO 'NO' IF YOU DO NOT WANT IT IN YOUR FAXED NOTE.

## 2020-04-24 ENCOUNTER — HOSPITAL ENCOUNTER (OUTPATIENT)
Dept: LAB | Facility: MEDICAL CENTER | Age: 70
End: 2020-04-24
Attending: INTERNAL MEDICINE
Payer: MEDICARE

## 2020-04-24 DIAGNOSIS — E78.5 DYSLIPIDEMIA: ICD-10-CM

## 2020-04-24 DIAGNOSIS — E03.9 HYPOTHYROIDISM (ACQUIRED): ICD-10-CM

## 2020-04-24 PROCEDURE — 84443 ASSAY THYROID STIM HORMONE: CPT

## 2020-04-24 PROCEDURE — 80061 LIPID PANEL: CPT

## 2020-04-24 PROCEDURE — 80053 COMPREHEN METABOLIC PANEL: CPT

## 2020-04-24 PROCEDURE — 84439 ASSAY OF FREE THYROXINE: CPT

## 2020-04-24 PROCEDURE — 36415 COLL VENOUS BLD VENIPUNCTURE: CPT

## 2020-04-25 LAB
ALBUMIN SERPL BCP-MCNC: 4.1 G/DL (ref 3.2–4.9)
ALBUMIN/GLOB SERPL: 1.7 G/DL
ALP SERPL-CCNC: 57 U/L (ref 30–99)
ALT SERPL-CCNC: 14 U/L (ref 2–50)
ANION GAP SERPL CALC-SCNC: 15 MMOL/L (ref 7–16)
AST SERPL-CCNC: 17 U/L (ref 12–45)
BILIRUB SERPL-MCNC: 0.5 MG/DL (ref 0.1–1.5)
BUN SERPL-MCNC: 13 MG/DL (ref 8–22)
CALCIUM SERPL-MCNC: 9.4 MG/DL (ref 8.5–10.5)
CHLORIDE SERPL-SCNC: 100 MMOL/L (ref 96–112)
CHOLEST SERPL-MCNC: 217 MG/DL (ref 100–199)
CO2 SERPL-SCNC: 23 MMOL/L (ref 20–33)
CREAT SERPL-MCNC: 0.76 MG/DL (ref 0.5–1.4)
FASTING STATUS PATIENT QL REPORTED: NORMAL
GLOBULIN SER CALC-MCNC: 2.4 G/DL (ref 1.9–3.5)
GLUCOSE SERPL-MCNC: 96 MG/DL (ref 65–99)
HDLC SERPL-MCNC: 99 MG/DL
LDLC SERPL CALC-MCNC: 102 MG/DL
POTASSIUM SERPL-SCNC: 4.1 MMOL/L (ref 3.6–5.5)
PROT SERPL-MCNC: 6.5 G/DL (ref 6–8.2)
SODIUM SERPL-SCNC: 138 MMOL/L (ref 135–145)
T4 FREE SERPL-MCNC: 1.28 NG/DL (ref 0.53–1.43)
TRIGL SERPL-MCNC: 81 MG/DL (ref 0–149)
TSH SERPL DL<=0.005 MIU/L-ACNC: 1.19 UIU/ML (ref 0.38–5.33)

## 2020-04-30 RX ORDER — LEVOTHYROXINE SODIUM 75 MCG
TABLET ORAL
Qty: 30 TAB | Refills: 0 | Status: SHIPPED | OUTPATIENT
Start: 2020-04-30 | End: 2020-05-08 | Stop reason: SDUPTHER

## 2020-05-07 ENCOUNTER — TELEPHONE (OUTPATIENT)
Dept: MEDICAL GROUP | Age: 70
End: 2020-05-07

## 2020-05-07 NOTE — TELEPHONE ENCOUNTER
ESTABLISHED PATIENT PRE-VISIT PLANNING     Patient was NOT contacted to complete PVP.     Note: Patient will not be contacted if there is no indication to call.     1.  Reviewed notes from the last few office visits within the medical group: Yes    2.  If any orders were placed at last visit or intended to be done for this visit (i.e. 6 mos follow-up), do we have Results/Consult Notes?        •  Labs - Labs ordered, completed on 04/25/2020 and results are in chart.   Note: If patient appointment is for lab review and patient did not complete labs, check with provider if OK to reschedule patient until labs completed.       •  Imaging - Imaging was not ordered at last office visit.       •  Referrals - Referral ordered, patient has NOT been seen.    3. Is this appointment scheduled as a Hospital Follow-Up? No    4.  Immunizations were updated in Epic using WebIZ?: Epic matches WebIZ       •  Web Iz Recommendations: Patient is up to date on all vaccines    5.  Patient is due for the following Health Maintenance Topics:   Health Maintenance Due   Topic Date Due   • Annual Wellness Visit  1950   • HEPATITIS C SCREENING  1950   • MAMMOGRAM  05/21/2020       6. Orders for overdue Health Maintenance topics pended in Pre-Charting? N\A    7.  AHA (MDX) form printed for Provider? YES    8.  Patient was NOT informed to arrive 15 min prior to their scheduled appointment and bring in their medication bottles.

## 2020-05-08 ENCOUNTER — TELEPHONE (OUTPATIENT)
Dept: MEDICAL GROUP | Age: 70
End: 2020-05-08

## 2020-05-08 ENCOUNTER — TELEMEDICINE (OUTPATIENT)
Dept: MEDICAL GROUP | Age: 70
End: 2020-05-08
Payer: MEDICARE

## 2020-05-08 VITALS — TEMPERATURE: 98.2 F | BODY MASS INDEX: 21.83 KG/M2 | HEIGHT: 66 IN | WEIGHT: 135.8 LBS | HEART RATE: 72 BPM

## 2020-05-08 DIAGNOSIS — I47.10 SVT (SUPRAVENTRICULAR TACHYCARDIA) (HCC): ICD-10-CM

## 2020-05-08 DIAGNOSIS — E78.5 DYSLIPIDEMIA: ICD-10-CM

## 2020-05-08 DIAGNOSIS — M85.80 SENILE OSTEOPENIA: ICD-10-CM

## 2020-05-08 DIAGNOSIS — Z78.0 MENOPAUSE: ICD-10-CM

## 2020-05-08 DIAGNOSIS — Z11.59 NEED FOR HEPATITIS C SCREENING TEST: ICD-10-CM

## 2020-05-08 DIAGNOSIS — E03.9 ACQUIRED HYPOTHYROIDISM: ICD-10-CM

## 2020-05-08 DIAGNOSIS — Z00.00 MEDICARE ANNUAL WELLNESS VISIT, SUBSEQUENT: ICD-10-CM

## 2020-05-08 DIAGNOSIS — R00.2 PALPITATIONS: ICD-10-CM

## 2020-05-08 DIAGNOSIS — Z00.00 HEALTH CARE MAINTENANCE: ICD-10-CM

## 2020-05-08 DIAGNOSIS — R25.1 TREMOR: ICD-10-CM

## 2020-05-08 DIAGNOSIS — E55.9 HYPOVITAMINOSIS D: ICD-10-CM

## 2020-05-08 PROCEDURE — G0439 PPPS, SUBSEQ VISIT: HCPCS | Mod: 95,CR | Performed by: INTERNAL MEDICINE

## 2020-05-08 PROCEDURE — 99214 OFFICE O/P EST MOD 30 MIN: CPT | Mod: 25,95,CR | Performed by: INTERNAL MEDICINE

## 2020-05-08 RX ORDER — ALPRAZOLAM 0.25 MG/1
TABLET ORAL
Qty: 30 TAB | Refills: 0 | Status: SHIPPED | OUTPATIENT
Start: 2020-05-08 | End: 2020-05-08 | Stop reason: SDUPTHER

## 2020-05-08 RX ORDER — LEVOTHYROXINE SODIUM 0.07 MG/1
75 TABLET ORAL
Qty: 90 TAB | Refills: 3 | Status: SHIPPED | OUTPATIENT
Start: 2020-05-08 | End: 2021-05-05 | Stop reason: SDUPTHER

## 2020-05-08 RX ORDER — ALPRAZOLAM 0.25 MG/1
TABLET ORAL
Qty: 30 TAB | Refills: 0 | Status: SHIPPED | OUTPATIENT
Start: 2020-05-08 | End: 2021-05-05 | Stop reason: SDUPTHER

## 2020-05-08 ASSESSMENT — ENCOUNTER SYMPTOMS: GENERAL WELL-BEING: EXCELLENT

## 2020-05-08 ASSESSMENT — FIBROSIS 4 INDEX: FIB4 SCORE: 1.12

## 2020-05-08 ASSESSMENT — ACTIVITIES OF DAILY LIVING (ADL): BATHING_REQUIRES_ASSISTANCE: 0

## 2020-05-08 ASSESSMENT — PATIENT HEALTH QUESTIONNAIRE - PHQ9: CLINICAL INTERPRETATION OF PHQ2 SCORE: 0

## 2020-05-08 NOTE — TELEPHONE ENCOUNTER
ESTABLISHED PATIENT PRE-VISIT PLANNING     Patient was NOT contacted to complete PVP.     Note: Patient will not be contacted if there is no indication to call.     1.  Reviewed notes from the last few office visits within the medical group: Yes    2.  If any orders were placed at last visit or intended to be done for this visit (i.e. 6 mos follow-up), do we have Results/Consult Notes?        •  Labs - Labs were not ordered at last office visit.   Note: If patient appointment is for lab review and patient did not complete labs, check with provider if OK to reschedule patient until labs completed.       •  Imaging - Imaging ordered, NOT completed. Patient advised to complete prior to next appointment. (PT has scheduled appointment for Mammo Screening)       •  Referrals - No referrals were ordered at last office visit.    3. Is this appointment scheduled as a Hospital Follow-Up? No    4.  Immunizations were updated in Epic using WebIZ?: Epic matches WebIZ       •  Web Iz Recommendations: Patient is up to date on all vaccines    5.  Patient is due for the following Health Maintenance Topics:   Health Maintenance Due   Topic Date Due   • Annual Wellness Visit  1950   • HEPATITIS C SCREENING  1950   • MAMMOGRAM  05/21/2020       6. Orders for overdue Health Maintenance topics pended in Pre-Charting? N\A    7.  AHA (MDX) form printed for Provider? No, already completed    8.  Patient was NOT informed to arrive 15 min prior to their scheduled appointment and bring in their medication bottles.

## 2020-05-08 NOTE — PROGRESS NOTES
Telemedicine Visit: Established Patient     This Remote Face to Face encounter was conducted via [employer]. Given the importance of social distancing and other strategies recommended to reduce the risk of COVID-19 transmission, I am providing medical care to this patient via audio/video visit in place of an in person visit at the request of the patient. Verbal consent to telehealth, risks, benefits, and consequences were discussed. Patient retains the right to withdraw at any time. All existing confidentiality protections apply. The patient has access to all transmitted medical information. No dissemination of any patient images or information to other entities without further written consent.    Chief Complaint   Patient presents with   •    ANNUAL, Medication Refill   • Lab Results       HPI:  Lina Freeman is a 70 y.o. here for Medicare Annual Wellness Visit     Dyslipidemia  The patient is on omega-3, OTC.  Diet: healthy  Exercise: 3 d/w  BMI:  22  FH: neg     Hypothyroidism  Levothyroxine, 75 mcg, taking daily early in am, before any po intake.  No temperature intolerance. No change in weight,  hair/skin quality, BMs.   No tremors, weakness.  No peripheral swelling.  No mood changes.  FH: multiple     Palpitations  The patient developed palpitations in 2019, 1 year ago, when she had full cardiology evaluation and cardiology consult.  She continued to have asymptomatic lasting palpitations 3 to 4 days in a week    She had a Holter monitor and had 48 beats of nonsustained SVT, some PVCs and PACs.  It is not certain if she was feeling symptoms.      No family history of coronary artery disease.  Mother has hypertension.     She was not limited by chest pain, pressure or tightness.   No significant dyspnea on exertion, orthopnea or lower extremity swelling.   No dizziness, or presyncope/syncope.   No symptoms of leg claudication.   No stroke/TIA like symptoms.    Echocardiography, 7/2/2019  No prior study is  available for comparison.   Normal left ventricular size, thickness, systolic function, and   diastolic function.  Left ventricular ejection fraction is visually estimated to be 65%.  Mild aortic insufficiency.  Estimated right ventricular systolic pressure  is 30 mmHg.    Essential tremor  Onset: her 20'  It has been in rest and activity, mostly R hand  - accompanied with head tremor.  Course: slightly worse, interfering with writing.  FH: multiple  She used a Xanax as needed, requested refill.     Osteopenia, hypovitaminosis D  Dexa scan, 7/24/2018:  The mean bone mineral density for the lumbar spine is 0.993 g/cm2, which corresponds to a T score of -1.5 and a Z score of 0.1.  The proximal left femur has a mean bone mineral density of 0.769 g/cm2, with a T score of -1.9 and a Z score of -0.6.     When compared with the most recent study dated 7/22/2016, there has been a 2.6% decrease in the bone mineral density of the lumbar spine and a 1.9% increase in the bone mineral density of the left femur.    Vit D/Ca supplement: yes  Exercise: 3 d/w  Nonsmoker.   Vitamin D was low.  BMI: 22  FH f osteoporosis: neg              - osteopenia: sister    Patient Active Problem List    Diagnosis Date Noted   • Hypovitaminosis D 05/07/2019   • Health care maintenance 05/07/2019   • Urge incontinence 05/11/2018   • Dyslipidemia 05/11/2018   • Benign skin lesion of nose 02/27/2018   • Essential tremor 02/26/2016   • Hypothyroidism (acquired)    • Senile osteopenia        Current Outpatient Medications   Medication Sig Dispense Refill   • levothyroxine (SYNTHROID) 75 MCG Tab Take 1 Tab by mouth every morning before breakfast. 90 Tab 3   • ALPRAZolam (XANAX) 0.25 MG Tab TAKE 1 TABLET BY MOUTH AT BEDTIME AS NEEDED FOR SLEEP 30 Tab 0   • Multiple Vitamins-Minerals (MULTIVITAMIN ADULT) Tab Take 1 Tab by mouth every day.     • Calcium Carb-Cholecalciferol (CALCIUM 1000 + D PO) Take 1 Tab by mouth every day.     • Cholecalciferol  (VITAMIN D-3 PO) Take 4,600 Units by mouth every day.     • Magnesium 400 MG Tab Take 1 Tab by mouth every day.     • Omega 3-6-9 Fatty Acids (TRIPLE OMEGA COMPLEX PO) Take 1 Tab by mouth every day.     • B Complex-C (SUPER B COMPLEX PO) Take 1 Tab by mouth every day.       No current facility-administered medications for this visit.           Current supplements as per medication list.     Allergies: Patient has no known allergies.    Current social contact/activities: With family and friends    Faby Freeman  reports that Faby Freeman quit smoking about 31 years ago. Faby Freeman's smoking use included cigarettes. Faby Freeman has a 20.00 pack-year smoking history. Faby Freeman has never used smokeless tobacco. Faby Freeman reports current alcohol use of about 1.0 oz of alcohol per week. Faby Freeman reports that Faby Freeman does not use drugs.  Counseling given: Not Answered    ROS:    Gait: Uses no assistive device  Ostomy: No  Other tubes: No  Amputations: No  Chronic oxygen use: No  Wears hearing aids: No   : Denies any urinary leakage during the last 6 months    Screening:    Depression Screening    Little interest or pleasure in doing things?  0 - not at all  Feeling down, depressed , or hopeless? 0 - not at all  Patient Health Questionnaire Score: 0     Screening for Cognitive Impairment  Three Minute Recall (village, kitchen, baby)  /3    Gregory clock face with all 12 numbers and set the hands to show 10 past 10.  Yes    Cognitive concerns identified deferred for follow up unless specifically addressed in assessment and plan.    Fall Risk Assessment  Has the patient had two or more falls in the last year or any fall with injury in the last year?  No    Safety Assessment  Throw rugs on floor.  Yes  Handrails on all stairs.  Yes  Good lighting in all hallways.  Yes  Difficulty hearing.  No  Patient counseled about all safety risks that were identified.    Functional Assessment  ADLs  Are there any barriers preventing you from cooking for yourself or meeting nutritional needs?  No.    Are there any barriers preventing you from driving safely or obtaining transportation?  No.    Are there any barriers preventing you from using a telephone or calling for help?  No.    Are there any barriers preventing you from shopping?  No.    Are there any barriers preventing you from taking care of your own finances?  No.    Are there any barriers preventing you from managing your medications?  No.    Are there any barriers preventing you from showering, bathing or dressing yourself?  No.    Are you currently engaging in any exercise or physical activity?  Yes.  2 d/w walk +  What is your perception of your health?  Excellent.    Health Maintenance Summary                Annual Wellness Visit Overdue 1950     HEPATITIS C SCREENING Overdue 1950     MAMMOGRAM Next Due 2020      Done 2019 MA-SCREENING MAMMO BILAT W/TOMOSYNTHESIS W/CAD     Patient has more history with this topic...    BONE DENSITY Next Due 2023      Done 2018 DS-BONE DENSITY STUDY (DEXA)     Patient has more history with this topic...    COLONOSCOPY Next Due 2026      Done 2016 AMB REFERRAL TO GI FOR COLONOSCOPY    IMM DTaP/Tdap/Td Vaccine Next Due 5/10/2027      Done 5/10/2017 Imm Admin: Tdap Vaccine        Patient Care Team:  Rosmery Flaherty M.D. as PCP - General (Family Medicine)  Noé Thompson O.D. as Consulting Physician (Optometry)  Laura Patel M.D. as Consulting Physician (OB/Gyn)  Nate Turcios D.D.S. as Consulting Physician (Dentistry)    Social History     Tobacco Use   • Smoking status: Former Smoker     Packs/day: 1.00     Years: 20.00     Pack years: 20.00     Types: Cigarettes     Last attempt to quit: 1989     Years since quittin.2   • Smokeless tobacco: Never Used   Substance Use Topics   • Alcohol use: Yes     Alcohol/week: 1.0 oz     Types: 2 Cans of beer per  "week     Comment: 3-4 x week   • Drug use: No     Family History   Problem Relation Age of Onset   • Cancer Mother         breast cancer   • Stroke Mother         Probably mini strokes   • Hypertension Mother         Rheumatic fever   • Breast Cancer Mother    • Thyroid Mother    • Cancer Father         lung cancer   • Thyroid Sister    • No Known Problems Brother    • Cancer Maternal Grandmother         breast   • Breast Cancer Maternal Grandmother    • Thyroid Sister    • Thyroid Sister    • Hyperlipidemia Neg Hx    • Heart Disease Neg Hx      Faby Freeman  has a past medical history of Hyperlipidemia, Hypothyroidism, Osteopenia, Osteopenia, and Tremor.   No past surgical history on file.    Exam:   Pulse 72   Temperature 36.8 °C (98.2 °F) (Temporal)   Height 1.664 m (5' 5.5\")   Weight 61.6 kg (135 lb 12.8 oz)  Body mass index is 22.25 kg/m².    Hearing good.    Dentition good  Alert, oriented in no acute distress.  Eye contact is good, speech goal directed, affect calm    Labs  Reviewed and discussed  Lab Results   Component Value Date/Time    CHOLSTRLTOT 217 (H) 04/24/2020 07:02 AM     (H) 04/24/2020 07:02 AM    HDL 99 04/24/2020 07:02 AM    TRIGLYCERIDE 81 04/24/2020 07:02 AM       Lab Results   Component Value Date/Time    SODIUM 138 04/24/2020 07:02 AM    POTASSIUM 4.1 04/24/2020 07:02 AM    CHLORIDE 100 04/24/2020 07:02 AM    CO2 23 04/24/2020 07:02 AM    GLUCOSE 96 04/24/2020 07:02 AM    BUN 13 04/24/2020 07:02 AM    CREATININE 0.76 04/24/2020 07:02 AM     Lab Results   Component Value Date/Time    ALKPHOSPHAT 57 04/24/2020 07:02 AM    ASTSGOT 17 04/24/2020 07:02 AM    ALTSGPT 14 04/24/2020 07:02 AM    TBILIRUBIN 0.5 04/24/2020 07:02 AM      No results found for: HBA1C  No results found for: TSH  Lab Results   Component Value Date/Time    FREET4 1.28 04/24/2020 07:02 AM    FREET4 1.19 05/10/2019 07:30 AM     Lab Results   Component Value Date/Time    WBC 5.1 05/19/2018 10:40 AM    RBC 4.39 " 05/19/2018 10:40 AM    HEMOGLOBIN 13.2 05/19/2018 10:40 AM    HEMATOCRIT 40.4 05/19/2018 10:40 AM    MCV 92.0 05/19/2018 10:40 AM    MCH 30.1 05/19/2018 10:40 AM    MCHC 32.7 (L) 05/19/2018 10:40 AM    MPV 11.9 05/19/2018 10:40 AM    NEUTSPOLYS 49.60 05/19/2018 10:40 AM    LYMPHOCYTES 37.50 05/19/2018 10:40 AM    MONOCYTES 9.20 05/19/2018 10:40 AM    EOSINOPHILS 1.20 05/19/2018 10:40 AM    BASOPHILS 2.10 (H) 05/19/2018 10:40 AM      Imaging    Per HPI    Assessment and Plan.     1. Medicare annual wellness visit, subsequent  Reviewed PMH, PSH, FH, SH, ALL, MEDS, HCM/IMM.   Advised healthy habits, diet, exercise.  - Subsequent Annual Wellness Visit - Includes PPPS ()    2. Need for hepatitis C screening test  - HEP C VIRUS ANTIBODY; Future  - Subsequent Annual Wellness Visit - Includes PPPS ()    3. Health care maintenance  Per HPI  - Subsequent Annual Wellness Visit - Includes PPPS ()    4. Dyslipidemia  Borderline; discussed treatment options, patient prefers low-calorie diet, daily exercise, weight control  - Comp Metabolic Panel; Future  - Lipid Profile; Future  - Subsequent Annual Wellness Visit - Includes PPPS ()    5. Acquired hypothyroidism  Controlled, continue current treatment  - TSH; Future  - FREE THYROXINE; Future  - Subsequent Annual Wellness Visit - Includes PPPS ()    6. Palpitations  She used Xanax as needed, given refill  - ALPRAZolam (XANAX) 0.25 MG Tab; TAKE 1 TABLET BY MOUTH AT BEDTIME AS NEEDED FOR SLEEP  Dispense: 30 Tab; Refill: 0  - Subsequent Annual Wellness Visit - Includes PPPS ()  7. SVT  As above.    8. Tremor  She is a candidate for beta-blocker with worsening symptoms that are affecting her daily activities  - Discussed  - ALPRAZolam (XANAX) 0.25 MG Tab; TAKE 1 TABLET BY MOUTH AT BEDTIME AS NEEDED FOR SLEEP  Dispense: 30 Tab; Refill: 0  - Subsequent Annual Wellness Visit - Includes PPPS ()    Obtained and reviewed patient utilization report from  [facility] pharmacy database on 5/8/2020 12:59 PM  prior to writing prescription for controlled substance II, III or IV per [place] bill . Based on assessment of the report, the prescription is medically necessary.     9. Senile osteopenia  Continue current regimen for follow-up bone density  - DS-BONE DENSITY STUDY (DEXA); Future  - Subsequent Annual Wellness Visit - Includes PPPS ()    10. Hypovitaminosis D  Continue current supplement, follow-up labs  - VITAMIN D,25 HYDROXY; Future  - Subsequent Annual Wellness Visit - Includes PPPS ()    11. Menopause  Per #8  - DS-BONE DENSITY STUDY (DEXA); Future  - Subsequent Annual Wellness Visit - Includes PPPS ()    Services suggested: No services needed at this time  Health Care Screening: Age-appropriate preventive services recommended by USPTF and ACIP covered by Medicare were discussed today. Services ordered if indicated and agreed upon by the patient.  Referrals offered: Community-based lifestyle interventions to reduce health risks and promote self-management and wellness, fall prevention, nutrition, physical activity, tobacco-use cessation, weight loss, and mental health services as per orders if indicated.    Discussion today about general wellness and lifestyle habits:    · Prevent falls and reduce trip hazards; Cautioned about securing or removing rugs.  · Have a working fire alarm and carbon monoxide detector;   · Engage in regular physical activity and social activities     Follow-up: In 1 year and as needed

## 2020-05-11 PROBLEM — I47.10 SVT (SUPRAVENTRICULAR TACHYCARDIA) (HCC): Status: ACTIVE | Noted: 2020-05-11

## 2020-05-11 PROBLEM — R00.2 PALPITATIONS: Status: ACTIVE | Noted: 2020-05-11

## 2020-06-05 ENCOUNTER — HOSPITAL ENCOUNTER (OUTPATIENT)
Dept: RADIOLOGY | Facility: MEDICAL CENTER | Age: 70
End: 2020-06-05
Attending: INTERNAL MEDICINE
Payer: MEDICARE

## 2020-06-05 DIAGNOSIS — Z12.31 VISIT FOR SCREENING MAMMOGRAM: ICD-10-CM

## 2020-06-05 PROCEDURE — 77067 SCR MAMMO BI INCL CAD: CPT

## 2020-08-14 ENCOUNTER — HOSPITAL ENCOUNTER (OUTPATIENT)
Dept: RADIOLOGY | Facility: MEDICAL CENTER | Age: 70
End: 2020-08-14
Attending: INTERNAL MEDICINE
Payer: MEDICARE

## 2020-08-14 DIAGNOSIS — Z78.0 MENOPAUSE: ICD-10-CM

## 2020-08-14 DIAGNOSIS — M85.80 SENILE OSTEOPENIA: ICD-10-CM

## 2020-08-14 PROCEDURE — 77080 DXA BONE DENSITY AXIAL: CPT

## 2020-10-24 ENCOUNTER — IMMUNIZATION (OUTPATIENT)
Dept: SOCIAL WORK | Facility: CLINIC | Age: 70
End: 2020-10-24
Payer: MEDICARE

## 2020-10-24 DIAGNOSIS — Z23 NEED FOR VACCINATION: ICD-10-CM

## 2020-10-24 PROCEDURE — 90662 IIV NO PRSV INCREASED AG IM: CPT | Performed by: REGISTERED NURSE

## 2020-10-24 PROCEDURE — G0008 ADMIN INFLUENZA VIRUS VAC: HCPCS | Performed by: REGISTERED NURSE

## 2021-02-09 ENCOUNTER — APPOINTMENT (RX ONLY)
Dept: URBAN - METROPOLITAN AREA CLINIC 22 | Facility: CLINIC | Age: 71
Setting detail: DERMATOLOGY
End: 2021-02-09

## 2021-02-09 DIAGNOSIS — L81.4 OTHER MELANIN HYPERPIGMENTATION: ICD-10-CM

## 2021-02-09 DIAGNOSIS — D22 MELANOCYTIC NEVI: ICD-10-CM

## 2021-02-09 DIAGNOSIS — L82.1 OTHER SEBORRHEIC KERATOSIS: ICD-10-CM

## 2021-02-09 DIAGNOSIS — Z71.89 OTHER SPECIFIED COUNSELING: ICD-10-CM

## 2021-02-09 PROBLEM — D22.5 MELANOCYTIC NEVI OF TRUNK: Status: ACTIVE | Noted: 2021-02-09

## 2021-02-09 PROCEDURE — ? SUNSCREEN TREATMENT REGIMEN

## 2021-02-09 PROCEDURE — 99213 OFFICE O/P EST LOW 20 MIN: CPT

## 2021-02-09 PROCEDURE — ? COUNSELING

## 2021-02-09 ASSESSMENT — LOCATION ZONE DERM
LOCATION ZONE: ARM
LOCATION ZONE: FACE
LOCATION ZONE: TRUNK

## 2021-02-09 ASSESSMENT — LOCATION DETAILED DESCRIPTION DERM
LOCATION DETAILED: RIGHT VENTRAL PROXIMAL FOREARM
LOCATION DETAILED: LEFT VENTRAL PROXIMAL FOREARM
LOCATION DETAILED: INFERIOR MID FOREHEAD
LOCATION DETAILED: SUPERIOR THORACIC SPINE
LOCATION DETAILED: LOWER STERNUM
LOCATION DETAILED: SUPERIOR LUMBAR SPINE

## 2021-02-09 ASSESSMENT — LOCATION SIMPLE DESCRIPTION DERM
LOCATION SIMPLE: LOWER BACK
LOCATION SIMPLE: CHEST
LOCATION SIMPLE: RIGHT FOREARM
LOCATION SIMPLE: INFERIOR FOREHEAD
LOCATION SIMPLE: UPPER BACK
LOCATION SIMPLE: LEFT FOREARM

## 2021-03-03 DIAGNOSIS — Z23 NEED FOR VACCINATION: ICD-10-CM

## 2021-05-01 ENCOUNTER — HOSPITAL ENCOUNTER (OUTPATIENT)
Dept: LAB | Facility: MEDICAL CENTER | Age: 71
End: 2021-05-01
Attending: INTERNAL MEDICINE
Payer: MEDICARE

## 2021-05-01 DIAGNOSIS — E03.9 ACQUIRED HYPOTHYROIDISM: ICD-10-CM

## 2021-05-01 DIAGNOSIS — E55.9 HYPOVITAMINOSIS D: ICD-10-CM

## 2021-05-01 DIAGNOSIS — E78.5 DYSLIPIDEMIA: ICD-10-CM

## 2021-05-01 DIAGNOSIS — Z11.59 NEED FOR HEPATITIS C SCREENING TEST: ICD-10-CM

## 2021-05-01 LAB
ALBUMIN SERPL BCP-MCNC: 4.1 G/DL (ref 3.2–4.9)
ALBUMIN/GLOB SERPL: 1.5 G/DL
ALP SERPL-CCNC: 68 U/L (ref 30–99)
ALT SERPL-CCNC: 20 U/L (ref 2–50)
ANION GAP SERPL CALC-SCNC: 10 MMOL/L (ref 7–16)
AST SERPL-CCNC: 22 U/L (ref 12–45)
BILIRUB SERPL-MCNC: 0.2 MG/DL (ref 0.1–1.5)
BUN SERPL-MCNC: 10 MG/DL (ref 8–22)
CALCIUM SERPL-MCNC: 9.2 MG/DL (ref 8.5–10.5)
CHLORIDE SERPL-SCNC: 105 MMOL/L (ref 96–112)
CHOLEST SERPL-MCNC: 204 MG/DL (ref 100–199)
CO2 SERPL-SCNC: 24 MMOL/L (ref 20–33)
CREAT SERPL-MCNC: 0.79 MG/DL (ref 0.5–1.4)
FASTING STATUS PATIENT QL REPORTED: NORMAL
GLOBULIN SER CALC-MCNC: 2.7 G/DL (ref 1.9–3.5)
GLUCOSE SERPL-MCNC: 89 MG/DL (ref 65–99)
HCV AB SER QL: NORMAL
HDLC SERPL-MCNC: 98 MG/DL
LDLC SERPL CALC-MCNC: 93 MG/DL
POTASSIUM SERPL-SCNC: 4.2 MMOL/L (ref 3.6–5.5)
PROT SERPL-MCNC: 6.8 G/DL (ref 6–8.2)
SODIUM SERPL-SCNC: 139 MMOL/L (ref 135–145)
T4 FREE SERPL-MCNC: 1.4 NG/DL (ref 0.93–1.7)
TRIGL SERPL-MCNC: 63 MG/DL (ref 0–149)
TSH SERPL DL<=0.005 MIU/L-ACNC: 1.22 UIU/ML (ref 0.38–5.33)

## 2021-05-01 PROCEDURE — 80053 COMPREHEN METABOLIC PANEL: CPT

## 2021-05-01 PROCEDURE — 86803 HEPATITIS C AB TEST: CPT

## 2021-05-01 PROCEDURE — 36415 COLL VENOUS BLD VENIPUNCTURE: CPT

## 2021-05-01 PROCEDURE — 84439 ASSAY OF FREE THYROXINE: CPT

## 2021-05-01 PROCEDURE — 82306 VITAMIN D 25 HYDROXY: CPT

## 2021-05-01 PROCEDURE — 80061 LIPID PANEL: CPT

## 2021-05-01 PROCEDURE — 84443 ASSAY THYROID STIM HORMONE: CPT

## 2021-05-04 ENCOUNTER — TELEPHONE (OUTPATIENT)
Dept: MEDICAL GROUP | Age: 71
End: 2021-05-04

## 2021-05-04 LAB — 25(OH)D3 SERPL-MCNC: 60 NG/ML (ref 30–80)

## 2021-05-04 NOTE — PROGRESS NOTES
ESTABLISHED PATIENT PRE-VISIT PLANNING   Tuesday, 05/04/2021@8:33AM:    Phone Number Called: [phone] (home)   Call outcome: Spoke to patient regarding message below.  Message: Advised office visit scheduled with , Wednesday, 05/05/2021, [address]. Offered Virtual Visit and patient declined.    Patient was contacted to complete PVP.     Note: Patient will not be contacted if there is no indication to call.     1.  Reviewed notes from the last few office visits within the medical group: Yes    2.  If any orders were placed at last visit or intended to be done for this visit (i.e. 6 mos follow-up), do we have Results/Consult Notes?         •  Labs - Labs ordered, completed on 05/01/2021 and results are in chart.  Note: If patient appointment is for lab review and patient did not complete labs, check with provider if OK to reschedule patient until labs completed.       •  Imaging - Imaging ordered, NOT completed.   MAMMO is scheduled.       •  Referrals - No referrals were ordered at last office visit.    3. Is this appointment scheduled as a Hospital Follow-Up? No    4.  Immunizations were updated in Epic using Reconcile Outside Information activity? Yes    5.  Patient is due for the following Health Maintenance Topics:   Health Maintenance Due   Topic Date Due   • COVID-19 Vaccine (1) Never done       6.  AHA (Pulse8) form printed for Provider? Yes

## 2021-05-05 ENCOUNTER — OFFICE VISIT (OUTPATIENT)
Dept: MEDICAL GROUP | Age: 71
End: 2021-05-05
Payer: MEDICARE

## 2021-05-05 VITALS
TEMPERATURE: 99 F | HEIGHT: 66 IN | OXYGEN SATURATION: 100 % | HEART RATE: 70 BPM | WEIGHT: 134.5 LBS | BODY MASS INDEX: 21.62 KG/M2 | SYSTOLIC BLOOD PRESSURE: 120 MMHG | DIASTOLIC BLOOD PRESSURE: 70 MMHG

## 2021-05-05 DIAGNOSIS — E78.5 DYSLIPIDEMIA: ICD-10-CM

## 2021-05-05 DIAGNOSIS — E03.9 ACQUIRED HYPOTHYROIDISM: ICD-10-CM

## 2021-05-05 DIAGNOSIS — Z00.00 MEDICARE ANNUAL WELLNESS VISIT, SUBSEQUENT: ICD-10-CM

## 2021-05-05 DIAGNOSIS — Z12.31 ENCOUNTER FOR SCREENING MAMMOGRAM FOR MALIGNANT NEOPLASM OF BREAST: ICD-10-CM

## 2021-05-05 DIAGNOSIS — R53.83 FATIGUE, UNSPECIFIED TYPE: ICD-10-CM

## 2021-05-05 DIAGNOSIS — M85.80 SENILE OSTEOPENIA: ICD-10-CM

## 2021-05-05 DIAGNOSIS — Z00.00 HEALTH CARE MAINTENANCE: ICD-10-CM

## 2021-05-05 DIAGNOSIS — R00.2 PALPITATIONS: ICD-10-CM

## 2021-05-05 DIAGNOSIS — G25.0 ESSENTIAL TREMOR: ICD-10-CM

## 2021-05-05 DIAGNOSIS — I47.10 SVT (SUPRAVENTRICULAR TACHYCARDIA) (HCC): ICD-10-CM

## 2021-05-05 PROBLEM — E55.9 HYPOVITAMINOSIS D: Status: RESOLVED | Noted: 2019-05-07 | Resolved: 2021-05-05

## 2021-05-05 PROBLEM — N39.41 URGE INCONTINENCE: Status: RESOLVED | Noted: 2018-05-11 | Resolved: 2021-05-05

## 2021-05-05 PROBLEM — L98.9 BENIGN SKIN LESION OF NOSE: Status: RESOLVED | Noted: 2018-02-27 | Resolved: 2021-05-05

## 2021-05-05 PROCEDURE — 99213 OFFICE O/P EST LOW 20 MIN: CPT | Performed by: INTERNAL MEDICINE

## 2021-05-05 RX ORDER — LEVOTHYROXINE SODIUM 0.07 MG/1
75 TABLET ORAL
Qty: 100 TABLET | Refills: 3 | Status: SHIPPED | OUTPATIENT
Start: 2021-05-05 | End: 2022-02-19

## 2021-05-05 RX ORDER — ALPRAZOLAM 0.25 MG/1
TABLET ORAL
Qty: 30 TABLET | Refills: 1 | Status: SHIPPED | OUTPATIENT
Start: 2021-05-05 | End: 2022-05-11 | Stop reason: SDUPTHER

## 2021-05-05 ASSESSMENT — ENCOUNTER SYMPTOMS: GENERAL WELL-BEING: EXCELLENT

## 2021-05-05 ASSESSMENT — PATIENT HEALTH QUESTIONNAIRE - PHQ9: CLINICAL INTERPRETATION OF PHQ2 SCORE: 0

## 2021-05-05 ASSESSMENT — ACTIVITIES OF DAILY LIVING (ADL): BATHING_REQUIRES_ASSISTANCE: 0

## 2021-05-05 NOTE — PROGRESS NOTES
Chief Complaint   Patient presents with   • Annual Exam   • Medication Refill     HPI:  Faby is a 71 y.o. here for Medicare Annual Wellness Visit    Patient Active Problem List    Diagnosis Date Noted   • Palpitations 05/11/2020   • SVT (supraventricular tachycardia) (HCC) 05/11/2020   • Health care maintenance 05/07/2019   • Dyslipidemia 05/11/2018   • Essential tremor 02/26/2016   • Acquired hypothyroidism    • Senile osteopenia      Current Outpatient Medications   Medication Sig Dispense Refill   • levothyroxine (SYNTHROID) 75 MCG Tab Take 1 tablet by mouth every morning before breakfast. 100 tablet 3   • Multiple Vitamins-Minerals (MULTIVITAMIN ADULT) Tab Take 1 Tab by mouth every day.     • Calcium Carb-Cholecalciferol (CALCIUM 1000 + D PO) Take 1 Tab by mouth every day.     • Cholecalciferol (VITAMIN D-3 PO) Take 4,600 Units by mouth every day.     • Magnesium 400 MG Tab Take 1 Tab by mouth every day.     • Omega 3-6-9 Fatty Acids (TRIPLE OMEGA COMPLEX PO) Take 1 Tab by mouth every day.     • B Complex-C (SUPER B COMPLEX PO) Take 1 Tab by mouth every day.       No current facility-administered medications for this visit.      Patient is taking medications as noted in medication list.  Current supplements as per medication list.     Allergies: Patient has no known allergies.    Current social contact/activities: Yoga, walking, hiking, weight bearing exercises     Is patient current with immunizations? Yes.    She  reports that she quit smoking about 32 years ago. Her smoking use included cigarettes. She has a 20.00 pack-year smoking history. She has never used smokeless tobacco. She reports current alcohol use of about 1.0 oz of alcohol per week. She reports that she does not use drugs.  Counseling given: Not Answered    DPA/Advanced directive: Patient has Advanced Directive on file.     ROS:    Gait: Uses no assistive device   Ostomy: No   Other tubes: No   Amputations: No   Chronic oxygen use No   Last eye  exam 06/2020   Wears hearing aids: No   : Denies any urinary leakage during the last 6 months    Screening:    Depression Screening  Little interest or pleasure in doing things?  0 - not at all  Feeling down, depressed, or hopeless? 0 - not at all  Patient Health Questionnaire Score: 0    Screening for Cognitive Impairment  Three Minute Recall (captain, garden, picture)  3/3 Captain, garden, picture  Gregory clock face with all 12 numbers and set the hands to show 5 past 8.  Yes 5/5  If cognitive concerns identified, deferred for follow up unless specifically addressed in assessment and plan.    Fall Risk Assessment  Has the patient had two or more falls in the last year or any fall with injury in the last year?  No  If fall risk identified, deferred for follow up unless specifically addressed in assessment and plan.    Safety Assessment  Throw rugs on floor.  Yes  Handrails on all stairs.  Yes  Good lighting in all hallways.  Yes  Difficulty hearing.  No  Patient counseled about all safety risks that were identified.    Functional Assessment ADLs  Are there any barriers preventing you from cooking for yourself or meeting nutritional needs?  No.    Are there any barriers preventing you from driving safely or obtaining transportation?  No.    Are there any barriers preventing you from using a telephone or calling for help?  No.    Are there any barriers preventing you from shopping?  No.    Are there any barriers preventing you from taking care of your own finances?  No.    Are there any barriers preventing you from managing your medications?  No.    Are there any barriers preventing you from showering, bathing or dressing yourself?  No.    Are you currently engaging in any exercise or physical activity?  Yes.  Yoga, walking, hiking, weight bearing exercises  What is your perception of your health?  Excellent.    Health Maintenance Summary                COVID-19 Vaccine Overdue 4/21/1966     MAMMOGRAM Next Due  2021      Done 2020 MA-SCREENING MAMMO BILAT W/TOMOSYNTHESIS W/CAD     Patient has more history with this topic...    Annual Wellness Visit Next Due 2022      Done 2021 Visit Dx: Medicare annual wellness visit, subsequent     Patient has more history with this topic...    BONE DENSITY Next Due 2025      Done 2020 DS-BONE DENSITY STUDY (DEXA)     Patient has more history with this topic...    COLONOSCOPY Next Due 2026      Done 2016 AMB REFERRAL TO GI FOR COLONOSCOPY    IMM DTaP/Tdap/Td Vaccine Next Due 5/10/2027      Done 5/10/2017 Imm Admin: Tdap Vaccine          Patient Care Team:  Rosmery Flaherty M.D. as PCP - General (Family Medicine)  Noé Thompson O.D. as Consulting Physician (Optometry)  Laura Patel M.D. as Consulting Physician (OB/Gyn)  Nate Turcios D.D.S. as Consulting Physician (Dentistry)    Social History     Tobacco Use   • Smoking status: Former Smoker     Packs/day: 1.00     Years: 20.00     Pack years: 20.00     Types: Cigarettes     Quit date: 1989     Years since quittin.2   • Smokeless tobacco: Never Used   Substance Use Topics   • Alcohol use: Yes     Alcohol/week: 1.0 oz     Types: 2 Cans of beer per week     Comment: 4-6 x week   • Drug use: No     Family History   Problem Relation Age of Onset   • Cancer Mother         breast cancer   • Stroke Mother         Probably mini strokes   • Hypertension Mother         Rheumatic fever   • Breast Cancer Mother    • Thyroid Mother    • Cancer Father         lung cancer   • Thyroid Sister    • No Known Problems Brother    • Cancer Maternal Grandmother         breast   • Breast Cancer Maternal Grandmother    • Thyroid Sister    • Thyroid Sister    • No Known Problems Maternal Grandfather    • No Known Problems Paternal Grandmother    • No Known Problems Paternal Grandfather    • No Known Problems Brother    • Hyperlipidemia Neg Hx    • Heart Disease Neg Hx      She  has a past medical history  "of Hyperlipidemia, Hypothyroidism, Osteopenia, Osteopenia, and Tremor.   History reviewed. No pertinent surgical history.    Exam:   /70 (BP Location: Left arm, Patient Position: Sitting, BP Cuff Size: Adult)   Pulse 70   Temp 37.2 °C (99 °F) (Temporal)   Ht 1.664 m (5' 5.5\")   Wt 61 kg (134 lb 8 oz)   SpO2 100%  Body mass index is 22.04 kg/m².  Hearing as above.    Dentition fair  Alert, oriented in no acute distress.  Eye contact is good, speech goal directed, affect calm    Labs  Reviewed and discussed  Lab Results   Component Value Date/Time    CHOLSTRLTOT 204 (H) 05/01/2021 08:38 AM    LDL 93 05/01/2021 08:38 AM    HDL 98 05/01/2021 08:38 AM    TRIGLYCERIDE 63 05/01/2021 08:38 AM       Lab Results   Component Value Date/Time    SODIUM 139 05/01/2021 08:38 AM    POTASSIUM 4.2 05/01/2021 08:38 AM    CHLORIDE 105 05/01/2021 08:38 AM    CO2 24 05/01/2021 08:38 AM    GLUCOSE 89 05/01/2021 08:38 AM    BUN 10 05/01/2021 08:38 AM    CREATININE 0.79 05/01/2021 08:38 AM     Lab Results   Component Value Date/Time    ALKPHOSPHAT 68 05/01/2021 08:38 AM    ASTSGOT 22 05/01/2021 08:38 AM    ALTSGPT 20 05/01/2021 08:38 AM    TBILIRUBIN 0.2 05/01/2021 08:38 AM      No results found for: HBA1C  No results found for: TSH  Lab Results   Component Value Date/Time    FREET4 1.40 05/01/2021 08:38 AM    FREET4 1.28 04/24/2020 07:02 AM     Lab Results   Component Value Date/Time    WBC 5.1 05/19/2018 10:40 AM    RBC 4.39 05/19/2018 10:40 AM    HEMOGLOBIN 13.2 05/19/2018 10:40 AM    HEMATOCRIT 40.4 05/19/2018 10:40 AM    MCV 92.0 05/19/2018 10:40 AM    MCH 30.1 05/19/2018 10:40 AM    MCHC 32.7 (L) 05/19/2018 10:40 AM    MPV 11.9 05/19/2018 10:40 AM    NEUTSPOLYS 49.60 05/19/2018 10:40 AM    LYMPHOCYTES 37.50 05/19/2018 10:40 AM    MONOCYTES 9.20 05/19/2018 10:40 AM    EOSINOPHILS 1.20 05/19/2018 10:40 AM    BASOPHILS 2.10 (H) 05/19/2018 10:40 AM      Assessment and Plan    1. Medicare annual wellness visit, " subsequent  Reviewed PMH, PSH, FH, SH, ALL, MEDS, HCM/IMM.   - Subsequent Annual Wellness Visit - Includes PPPS ()    2. Health care maintenance  Per HPI  - Subsequent Annual Wellness Visit - Includes PPPS ()    3. Encounter for screening mammogram for malignant neoplasm of breast  - Subsequent Annual Wellness Visit - Includes PPPS ()  - MA-SCREENING MAMMO BILAT W/TOMOSYNTHESIS W/CAD; Future    4. SVT (supraventricular tachycardia) (HCC)  - Controlled, continue with current management.  - Subsequent Annual Wellness Visit - Includes PPPS ()  5. Palpitations  - Subsequent Annual Wellness Visit - Includes PPPS ()  - ALPRAZolam (XANAX) 0.25 MG Tab; TAKE 1 TABLET BY MOUTH AT BEDTIME AS NEEDED FOR SLEEP  Dispense: 30 tablet; Refill: 1    6. Dyslipidemia  Borderline, advised low galen diet, exercise QD  - Subsequent Annual Wellness Visit - Includes PPPS ()  - Lipid Profile; Future    7. Acquired hypothyroidism  - Controlled, continue with current management.  - Subsequent Annual Wellness Visit - Includes PPPS ()  - levothyroxine (SYNTHROID) 75 MCG Tab; Take 1 tablet by mouth every morning before breakfast.  Dispense: 100 tablet; Refill: 3  - TSH WITH REFLEX TO FT4; Standing    8. Fatigue, unspecified type  Pending labs  - Subsequent Annual Wellness Visit - Includes PPPS ()  - CBC WITH DIFFERENTIAL; Future    9. Essential tremor  - Controlled, continue with current management.  - Subsequent Annual Wellness Visit - Includes PPPS ()  - ALPRAZolam (XANAX) 0.25 MG Tab; TAKE 1 TABLET BY MOUTH AT BEDTIME AS NEEDED FOR SLEEP  Dispense: 30 tablet; Refill: 1    10. Senile osteopenia  Take daily vitamin D and calcium supplement, weightbearing exercise.   - Subsequent Annual Wellness Visit - Includes PPPS ()    Services suggested: No services needed at this time  Health Care Screening recommendations as per orders if indicated.  Referrals offered: PT/OT/Nutrition counseling/Behavioral  Health/Smoking cessation as per orders if indicated.    Discussion today about general wellness and lifestyle habits:    · Prevent falls and reduce trip hazards; Cautioned about securing or removing rugs.  · Have a working fire alarm and carbon monoxide detector;   · Engage in regular physical activity and social activities       Follow-up: Return in about 1 year (around 5/5/2022) for LABS, ANNUAL.

## 2021-05-06 ENCOUNTER — TELEPHONE (OUTPATIENT)
Dept: MEDICAL GROUP | Age: 71
End: 2021-05-06

## 2021-05-06 NOTE — TELEPHONE ENCOUNTER
DOCUMENTATION OF PAR STATUS:    1. Name of Medication & Dose: Synthroid 75MCG     2. Name of Prescription Coverage Company & phone #: [email or web address]    3. Date Prior Auth Submitted: 05/06/2021    4. What information was given to obtain insurance decision? DRUG;DOSE;NPI    5. Prior Auth Status? Pending    6. Patient Notified: no

## 2021-05-07 NOTE — TELEPHONE ENCOUNTER
FINAL PRIOR AUTHORIZATION STATUS:    1.  Name of Medication & Dose: Synthroid     2. Prior Auth Status: Approved through 05/06/2022     3. Action Taken: Pharmacy Notified: yes Patient Notified: yes

## 2021-06-16 ENCOUNTER — HOSPITAL ENCOUNTER (OUTPATIENT)
Dept: RADIOLOGY | Facility: MEDICAL CENTER | Age: 71
End: 2021-06-16
Attending: INTERNAL MEDICINE
Payer: MEDICARE

## 2021-06-16 DIAGNOSIS — Z12.31 VISIT FOR SCREENING MAMMOGRAM: ICD-10-CM

## 2021-06-16 PROCEDURE — 77063 BREAST TOMOSYNTHESIS BI: CPT

## 2021-06-25 ENCOUNTER — HOSPITAL ENCOUNTER (OUTPATIENT)
Dept: RADIOLOGY | Facility: MEDICAL CENTER | Age: 71
End: 2021-06-25
Attending: INTERNAL MEDICINE
Payer: MEDICARE

## 2021-06-25 DIAGNOSIS — R92.8 ABNORMAL MAMMOGRAM: ICD-10-CM

## 2021-06-25 PROCEDURE — G0279 TOMOSYNTHESIS, MAMMO: HCPCS | Mod: RT

## 2021-06-25 PROCEDURE — 76642 ULTRASOUND BREAST LIMITED: CPT | Mod: RT

## 2021-07-07 ENCOUNTER — TELEPHONE (OUTPATIENT)
Dept: MEDICAL GROUP | Age: 71
End: 2021-07-07

## 2021-07-07 NOTE — TELEPHONE ENCOUNTER
Phone Number Called: [phone] (home)     Call outcome: Spoke to patient regarding message below.    Message: Patient called asking if it would be possible to get prescribed Ivermectin for coivd-19 prevention. She does not want to get the covid vaccine. She has done research and is interested in trying this way of prevention. Let patient know this is not generally something we do but that I would relay her message to her PCP and that someone would call her back after we revieved a response.

## 2021-07-08 NOTE — TELEPHONE ENCOUNTER
Phone Number Called: [phone] (home)     Call outcome: Spoke to patient regarding message below.    Message: Let patient know Dr. Linton does not prescribe this medication for covid prevention as it is not FDA approved for this use.

## 2021-08-23 ENCOUNTER — PATIENT MESSAGE (OUTPATIENT)
Dept: HEALTH INFORMATION MANAGEMENT | Facility: OTHER | Age: 71
End: 2021-08-23

## 2022-02-15 ENCOUNTER — PATIENT MESSAGE (OUTPATIENT)
Dept: HEALTH INFORMATION MANAGEMENT | Facility: OTHER | Age: 72
End: 2022-02-15
Payer: MEDICARE

## 2022-05-04 ENCOUNTER — TELEPHONE (OUTPATIENT)
Dept: HEALTH INFORMATION MANAGEMENT | Facility: OTHER | Age: 72
End: 2022-05-04

## 2022-05-05 ENCOUNTER — HOSPITAL ENCOUNTER (OUTPATIENT)
Dept: LAB | Facility: MEDICAL CENTER | Age: 72
End: 2022-05-05
Attending: INTERNAL MEDICINE
Payer: MEDICARE

## 2022-05-05 DIAGNOSIS — R53.83 FATIGUE, UNSPECIFIED TYPE: ICD-10-CM

## 2022-05-05 DIAGNOSIS — E03.9 ACQUIRED HYPOTHYROIDISM: ICD-10-CM

## 2022-05-05 DIAGNOSIS — E78.5 DYSLIPIDEMIA: ICD-10-CM

## 2022-05-05 LAB
BASOPHILS # BLD AUTO: 2 % (ref 0–1.8)
BASOPHILS # BLD: 0.11 K/UL (ref 0–0.12)
CHOLEST SERPL-MCNC: 214 MG/DL (ref 100–199)
EOSINOPHIL # BLD AUTO: 0.32 K/UL (ref 0–0.51)
EOSINOPHIL NFR BLD: 5.9 % (ref 0–6.9)
ERYTHROCYTE [DISTWIDTH] IN BLOOD BY AUTOMATED COUNT: 48 FL (ref 35.9–50)
FASTING STATUS PATIENT QL REPORTED: NORMAL
HCT VFR BLD AUTO: 41.3 % (ref 37–47)
HDLC SERPL-MCNC: 86 MG/DL
HGB BLD-MCNC: 13.6 G/DL (ref 12–16)
IMM GRANULOCYTES # BLD AUTO: 0.01 K/UL (ref 0–0.11)
IMM GRANULOCYTES NFR BLD AUTO: 0.2 % (ref 0–0.9)
LDLC SERPL CALC-MCNC: 112 MG/DL
LYMPHOCYTES # BLD AUTO: 1.93 K/UL (ref 1–4.8)
LYMPHOCYTES NFR BLD: 35.8 % (ref 22–41)
MCH RBC QN AUTO: 31.3 PG (ref 27–33)
MCHC RBC AUTO-ENTMCNC: 32.9 G/DL (ref 33.6–35)
MCV RBC AUTO: 95.2 FL (ref 81.4–97.8)
MONOCYTES # BLD AUTO: 0.59 K/UL (ref 0–0.85)
MONOCYTES NFR BLD AUTO: 10.9 % (ref 0–13.4)
NEUTROPHILS # BLD AUTO: 2.43 K/UL (ref 2–7.15)
NEUTROPHILS NFR BLD: 45.2 % (ref 44–72)
NRBC # BLD AUTO: 0 K/UL
NRBC BLD-RTO: 0 /100 WBC
PLATELET # BLD AUTO: 329 K/UL (ref 164–446)
PMV BLD AUTO: 11.1 FL (ref 9–12.9)
RBC # BLD AUTO: 4.34 M/UL (ref 4.2–5.4)
TRIGL SERPL-MCNC: 80 MG/DL (ref 0–149)
TSH SERPL DL<=0.005 MIU/L-ACNC: 0.63 UIU/ML (ref 0.38–5.33)
WBC # BLD AUTO: 5.4 K/UL (ref 4.8–10.8)

## 2022-05-05 PROCEDURE — 36415 COLL VENOUS BLD VENIPUNCTURE: CPT

## 2022-05-05 PROCEDURE — 85025 COMPLETE CBC W/AUTO DIFF WBC: CPT

## 2022-05-05 PROCEDURE — 80061 LIPID PANEL: CPT

## 2022-05-05 PROCEDURE — 84443 ASSAY THYROID STIM HORMONE: CPT

## 2022-05-08 SDOH — ECONOMIC STABILITY: HOUSING INSECURITY
IN THE LAST 12 MONTHS, WAS THERE A TIME WHEN YOU DID NOT HAVE A STEADY PLACE TO SLEEP OR SLEPT IN A SHELTER (INCLUDING NOW)?: PATIENT REFUSED

## 2022-05-08 SDOH — ECONOMIC STABILITY: HOUSING INSECURITY: IN THE LAST 12 MONTHS, HOW MANY PLACES HAVE YOU LIVED?: 1

## 2022-05-08 SDOH — ECONOMIC STABILITY: TRANSPORTATION INSECURITY
IN THE PAST 12 MONTHS, HAS LACK OF TRANSPORTATION KEPT YOU FROM MEETINGS, WORK, OR FROM GETTING THINGS NEEDED FOR DAILY LIVING?: PATIENT DECLINED

## 2022-05-08 SDOH — ECONOMIC STABILITY: INCOME INSECURITY: HOW HARD IS IT FOR YOU TO PAY FOR THE VERY BASICS LIKE FOOD, HOUSING, MEDICAL CARE, AND HEATING?: PATIENT DECLINED

## 2022-05-08 SDOH — ECONOMIC STABILITY: TRANSPORTATION INSECURITY
IN THE PAST 12 MONTHS, HAS THE LACK OF TRANSPORTATION KEPT YOU FROM MEDICAL APPOINTMENTS OR FROM GETTING MEDICATIONS?: PATIENT DECLINED

## 2022-05-08 SDOH — ECONOMIC STABILITY: FOOD INSECURITY: WITHIN THE PAST 12 MONTHS, YOU WORRIED THAT YOUR FOOD WOULD RUN OUT BEFORE YOU GOT MONEY TO BUY MORE.: PATIENT DECLINED

## 2022-05-08 SDOH — HEALTH STABILITY: PHYSICAL HEALTH: ON AVERAGE, HOW MANY MINUTES DO YOU ENGAGE IN EXERCISE AT THIS LEVEL?: 40 MIN

## 2022-05-08 SDOH — ECONOMIC STABILITY: INCOME INSECURITY: IN THE LAST 12 MONTHS, WAS THERE A TIME WHEN YOU WERE NOT ABLE TO PAY THE MORTGAGE OR RENT ON TIME?: PATIENT REFUSED

## 2022-05-08 SDOH — ECONOMIC STABILITY: TRANSPORTATION INSECURITY
IN THE PAST 12 MONTHS, HAS LACK OF RELIABLE TRANSPORTATION KEPT YOU FROM MEDICAL APPOINTMENTS, MEETINGS, WORK OR FROM GETTING THINGS NEEDED FOR DAILY LIVING?: PATIENT DECLINED

## 2022-05-08 SDOH — HEALTH STABILITY: PHYSICAL HEALTH: ON AVERAGE, HOW MANY DAYS PER WEEK DO YOU ENGAGE IN MODERATE TO STRENUOUS EXERCISE (LIKE A BRISK WALK)?: 3 DAYS

## 2022-05-08 SDOH — HEALTH STABILITY: MENTAL HEALTH
STRESS IS WHEN SOMEONE FEELS TENSE, NERVOUS, ANXIOUS, OR CAN'T SLEEP AT NIGHT BECAUSE THEIR MIND IS TROUBLED. HOW STRESSED ARE YOU?: NOT AT ALL

## 2022-05-08 SDOH — ECONOMIC STABILITY: FOOD INSECURITY: WITHIN THE PAST 12 MONTHS, THE FOOD YOU BOUGHT JUST DIDN'T LAST AND YOU DIDN'T HAVE MONEY TO GET MORE.: PATIENT DECLINED

## 2022-05-08 ASSESSMENT — SOCIAL DETERMINANTS OF HEALTH (SDOH)
DO YOU BELONG TO ANY CLUBS OR ORGANIZATIONS SUCH AS CHURCH GROUPS UNIONS, FRATERNAL OR ATHLETIC GROUPS, OR SCHOOL GROUPS?: PATIENT DECLINED
HOW HARD IS IT FOR YOU TO PAY FOR THE VERY BASICS LIKE FOOD, HOUSING, MEDICAL CARE, AND HEATING?: PATIENT DECLINED
HOW OFTEN DO YOU HAVE A DRINK CONTAINING ALCOHOL: 2-4 TIMES A MONTH
IN A TYPICAL WEEK, HOW MANY TIMES DO YOU TALK ON THE PHONE WITH FAMILY, FRIENDS, OR NEIGHBORS?: PATIENT DECLINED
HOW OFTEN DO YOU ATTEND CHURCH OR RELIGIOUS SERVICES?: PATIENT DECLINED
DO YOU BELONG TO ANY CLUBS OR ORGANIZATIONS SUCH AS CHURCH GROUPS UNIONS, FRATERNAL OR ATHLETIC GROUPS, OR SCHOOL GROUPS?: PATIENT DECLINED
HOW OFTEN DO YOU ATTENT MEETINGS OF THE CLUB OR ORGANIZATION YOU BELONG TO?: PATIENT DECLINED
HOW OFTEN DO YOU GET TOGETHER WITH FRIENDS OR RELATIVES?: PATIENT DECLINED
HOW OFTEN DO YOU HAVE SIX OR MORE DRINKS ON ONE OCCASION: NEVER
HOW OFTEN DO YOU ATTENT MEETINGS OF THE CLUB OR ORGANIZATION YOU BELONG TO?: PATIENT DECLINED
IN A TYPICAL WEEK, HOW MANY TIMES DO YOU TALK ON THE PHONE WITH FAMILY, FRIENDS, OR NEIGHBORS?: PATIENT DECLINED
HOW MANY DRINKS CONTAINING ALCOHOL DO YOU HAVE ON A TYPICAL DAY WHEN YOU ARE DRINKING: 1 OR 2
HOW OFTEN DO YOU GET TOGETHER WITH FRIENDS OR RELATIVES?: PATIENT DECLINED
WITHIN THE PAST 12 MONTHS, YOU WORRIED THAT YOUR FOOD WOULD RUN OUT BEFORE YOU GOT THE MONEY TO BUY MORE: PATIENT DECLINED
HOW OFTEN DO YOU ATTEND CHURCH OR RELIGIOUS SERVICES?: PATIENT DECLINED

## 2022-05-08 ASSESSMENT — LIFESTYLE VARIABLES
HOW OFTEN DO YOU HAVE SIX OR MORE DRINKS ON ONE OCCASION: NEVER
HOW OFTEN DO YOU HAVE A DRINK CONTAINING ALCOHOL: 2-4 TIMES A MONTH
HOW MANY STANDARD DRINKS CONTAINING ALCOHOL DO YOU HAVE ON A TYPICAL DAY: 1 OR 2

## 2022-05-11 ENCOUNTER — OFFICE VISIT (OUTPATIENT)
Dept: MEDICAL GROUP | Age: 72
End: 2022-05-11
Payer: MEDICARE

## 2022-05-11 VITALS
RESPIRATION RATE: 16 BRPM | SYSTOLIC BLOOD PRESSURE: 130 MMHG | OXYGEN SATURATION: 98 % | BODY MASS INDEX: 21.38 KG/M2 | TEMPERATURE: 98.7 F | WEIGHT: 133 LBS | HEIGHT: 66 IN | HEART RATE: 77 BPM | DIASTOLIC BLOOD PRESSURE: 80 MMHG

## 2022-05-11 DIAGNOSIS — I47.10 SVT (SUPRAVENTRICULAR TACHYCARDIA) (HCC): ICD-10-CM

## 2022-05-11 DIAGNOSIS — Z00.00 MEDICARE ANNUAL WELLNESS VISIT, SUBSEQUENT: ICD-10-CM

## 2022-05-11 DIAGNOSIS — Z12.31 ENCOUNTER FOR SCREENING MAMMOGRAM FOR MALIGNANT NEOPLASM OF BREAST: ICD-10-CM

## 2022-05-11 DIAGNOSIS — Z78.0 MENOPAUSE: ICD-10-CM

## 2022-05-11 DIAGNOSIS — Z00.00 HEALTH CARE MAINTENANCE: ICD-10-CM

## 2022-05-11 DIAGNOSIS — R53.83 FATIGUE, UNSPECIFIED TYPE: ICD-10-CM

## 2022-05-11 DIAGNOSIS — E03.9 ACQUIRED HYPOTHYROIDISM: ICD-10-CM

## 2022-05-11 DIAGNOSIS — G25.0 ESSENTIAL TREMOR: ICD-10-CM

## 2022-05-11 DIAGNOSIS — E78.5 DYSLIPIDEMIA: ICD-10-CM

## 2022-05-11 DIAGNOSIS — R00.2 PALPITATIONS: ICD-10-CM

## 2022-05-11 PROCEDURE — G0439 PPPS, SUBSEQ VISIT: HCPCS | Performed by: INTERNAL MEDICINE

## 2022-05-11 RX ORDER — LEVOTHYROXINE SODIUM 0.07 MG/1
75 TABLET ORAL
Qty: 100 TABLET | Refills: 3 | Status: SHIPPED | OUTPATIENT
Start: 2022-05-11 | End: 2023-05-12 | Stop reason: SDUPTHER

## 2022-05-11 RX ORDER — LEVOTHYROXINE SODIUM 0.07 MG/1
75 TABLET ORAL
Qty: 100 TABLET | Refills: 3 | Status: SHIPPED | OUTPATIENT
Start: 2022-05-11 | End: 2022-05-11 | Stop reason: SDUPTHER

## 2022-05-11 RX ORDER — ALPRAZOLAM 0.25 MG/1
TABLET ORAL
Qty: 30 TABLET | Refills: 1 | Status: SHIPPED | OUTPATIENT
Start: 2022-05-11 | End: 2022-06-11

## 2022-05-11 ASSESSMENT — ACTIVITIES OF DAILY LIVING (ADL): BATHING_REQUIRES_ASSISTANCE: 0

## 2022-05-11 ASSESSMENT — ENCOUNTER SYMPTOMS: GENERAL WELL-BEING: GOOD

## 2022-05-11 ASSESSMENT — PATIENT HEALTH QUESTIONNAIRE - PHQ9: CLINICAL INTERPRETATION OF PHQ2 SCORE: 0

## 2022-05-11 ASSESSMENT — FIBROSIS 4 INDEX: FIB4 SCORE: 1.06

## 2022-05-11 NOTE — PROGRESS NOTES
Annual    HPI:  Lina Freeman is a 72 y.o. here for Medicare Annual Wellness Visit     Patient Active Problem List    Diagnosis Date Noted   • Palpitations 05/11/2020   • SVT (supraventricular tachycardia) (HCC) 05/11/2020   • Health care maintenance 05/07/2019   • Dyslipidemia 05/11/2018   • Essential tremor 02/26/2016   • Acquired hypothyroidism    • Senile osteopenia      Current Outpatient Medications   Medication Sig Dispense Refill   • levothyroxine (SYNTHROID) 75 MCG Tab Take 1 Tablet by mouth every morning before breakfast. 100 Tablet 3   • Multiple Vitamins-Minerals (MULTIVITAMIN ADULT) Tab Take 1 Tab by mouth every day.     • Magnesium 400 MG Tab Take 1 Tab by mouth every day.     • B Complex-C (SUPER B COMPLEX PO) Take 1 Tab by mouth every day.       No current facility-administered medications for this visit.          Current supplements as per medication list.     Allergies: Patient has no known allergies.    She  reports that she quit smoking about 33 years ago. Her smoking use included cigarettes. She has a 20.00 pack-year smoking history. She has never used smokeless tobacco. She reports current alcohol use of about 1.0 oz of alcohol per week. She reports that she does not use drugs.  Counseling given: Not Answered    DPA/Advanced Directive:  Patient has Advanced Directive on file.     ROS:    Gait: Uses no assistive device  Ostomy: No  Other tubes: No  Amputations: No  Chronic oxygen use: No  Last eye exam: Annually done  Wears hearing aids: No   : Denies any urinary leakage during the last 6 months    Screening:    Depression Screening  Little interest or pleasure in doing things?  0 - not at all  Feeling down, depressed , or hopeless? 0 - not at all  Patient Health Questionnaire Score: 0     Screening for Cognitive Impairment  Three Minute Recall (daughter, heaven, mountain) 3/3    Gregory clock face with all 12 numbers and set the hands to show 10 past 11.  Yes    Cognitive concerns identified  deferred for follow up unless specifically addressed in assessment and plan.    Fall Risk Assessment  Has the patient had two or more falls in the last year or any fall with injury in the last year?  No    Safety Assessment  Throw rugs on floor.  Yes  Handrails on all stairs.  No  Good lighting in all hallways.  Yes  Difficulty hearing.  No  Patient counseled about all safety risks that were identified.    Functional Assessment ADLs  Are there any barriers preventing you from cooking for yourself or meeting nutritional needs?  No.    Are there any barriers preventing you from driving safely or obtaining transportation?  No.    Are there any barriers preventing you from using a telephone or calling for help?  No.    Are there any barriers preventing you from shopping?  No.    Are there any barriers preventing you from taking care of your own finances?  No.    Are there any barriers preventing you from managing your medications?  No.    Are there any barriers preventing you from showering, bathing or dressing yourself?  No.    Are you currently engaging in any exercise or physical activity?  Yes.  Yoga, walking 3 times a week  What is your perception of your health?  Good.    Health Maintenance Summary          Overdue - COVID-19 Vaccine (1) Overdue - never done    No completion history exists for this topic.          MAMMOGRAM (Yearly) Next due on 6/25/2022 06/25/2021  MA-DIAGNOSTIC MAMMO RIGHT W/TOMOSYNTHESIS W/O CAD    06/16/2021  MA-SCREENING MAMMO BILAT W/TOMOSYNTHESIS W/CAD    06/05/2020  MA-SCREENING MAMMO BILAT W/TOMOSYNTHESIS W/CAD    05/21/2019  MA-SCREENING MAMMO BILAT W/TOMOSYNTHESIS W/CAD    05/15/2018  MA-MAMMO SCREENING BILAT W/TODD W/CAD    Only the first 5 history entries have been loaded, but more history exists.          IMM INFLUENZA (Season Ended) Next due on 9/1/2022    10/24/2020  Imm Admin: Influenza Vaccine Adult HD    11/26/2019  Imm Admin: Influenza, Unspecified - HISTORICAL DATA     10/11/2018  Imm Admin: Influenza, Unspecified - HISTORICAL DATA    09/23/2017  Imm Admin: Influenza Vaccine Adult HD          Annual Wellness Visit (Every 366 Days) Next due on 5/12/2023 05/11/2022  Visit Dx: Medicare annual wellness visit, subsequent    05/11/2022  Subsequent Annual Wellness Visit - Includes PPPS ()    05/05/2021  Visit Dx: Medicare annual wellness visit, subsequent    05/05/2021  Subsequent Annual Wellness Visit - Includes PPPS ()    05/08/2020  Visit Dx: Medicare annual wellness visit, subsequent    Only the first 5 history entries have been loaded, but more history exists.          BONE DENSITY (Every 5 Years) Next due on 8/14/2025 08/14/2020  DS-BONE DENSITY STUDY (DEXA)    07/24/2018  DS-BONE DENSITY STUDY (DEXA)    07/22/2016  DS-BONE DENSITY STUDY (DEXA)    01/15/2014  DS-BONE DENSITY STUDY (DEXA)    11/08/2011  DS-BONE DENSITY STUDY (DEXA)          COLORECTAL CANCER SCREENING (COLONOSCOPY - Every 10 Years) Next due on 6/8/2026 06/08/2016  AMB REFERRAL TO GI FOR COLONOSCOPY    02/07/2016  OCCULT BLOOD FECES IMMUNOASSAY          IMM DTaP/Tdap/Td Vaccine (2 - Td or Tdap) Next due on 5/10/2027    05/10/2017  Imm Admin: Tdap Vaccine          IMM ZOSTER VACCINES (Series Information) Completed    09/24/2019  Imm Admin: Zoster Vaccine Recombinant (RZV) (SHINGRIX)    03/01/2019  Imm Admin: Zoster Vaccine Recombinant (RZV) (SHINGRIX)          IMM PNEUMOCOCCAL VACCINE: 65+ Years (Series Information) Completed    10/29/2019  Imm Admin: Pneumococcal polysaccharide vaccine (PPSV-23)    10/26/2018  Imm Admin: Pneumococcal Conjugate Vaccine (Prevnar/PCV-13)          HEPATITIS C SCREENING  Completed    05/01/2021  HEP C VIRUS ANTIBODY          IMM HEP B VACCINE (Series Information) Aged Out    No completion history exists for this topic.          IMM MENINGOCOCCAL VACCINE (MCV4) (Series Information) Aged Out    No completion history exists for this topic.          Discontinued - PAP SMEAR  " Discontinued    2017  PATHOLOGY GYN SPECIMEN              Patient Care Team:  Rosmery Flaherty M.D. as PCP - General (Family Medicine)  Rosmery Flaherty M.D. as PCP - [facility] Paneled  Noé Thompson O.D. as Consulting Physician (Optometry)  Nate Turcios D.D.SClary as Consulting Physician (Dentistry)    Social History     Tobacco Use   • Smoking status: Former Smoker     Packs/day: 1.00     Years: 20.00     Pack years: 20.00     Types: Cigarettes     Quit date: 1989     Years since quittin.2   • Smokeless tobacco: Never Used   Vaping Use   • Vaping Use: Never used   Substance Use Topics   • Alcohol use: Yes     Alcohol/week: 1.0 oz     Types: 2 Cans of beer per week     Comment: 4-6 x week   • Drug use: No     Family History   Problem Relation Age of Onset   • Cancer Mother         breast cancer   • Stroke Mother         Probably mini strokes   • Hypertension Mother         Rheumatic fever   • Breast Cancer Mother    • Thyroid Mother    • Cancer Father         lung cancer   • Thyroid Sister    • No Known Problems Brother    • Cancer Maternal Grandmother         breast   • Breast Cancer Maternal Grandmother    • Thyroid Sister    • Thyroid Sister    • No Known Problems Maternal Grandfather    • No Known Problems Paternal Grandmother    • No Known Problems Paternal Grandfather    • No Known Problems Brother    • Hyperlipidemia Neg Hx    • Heart Disease Neg Hx      She  has a past medical history of Hyperlipidemia, Hypothyroidism, Osteopenia, Osteopenia, and Tremor.   History reviewed. No pertinent surgical history.    Exam:   Pulse 77   Temp 37.1 °C (98.7 °F) (Temporal)   Resp 16   Ht 1.676 m (5' 6\")   Wt 60.3 kg (133 lb)   SpO2 98%  Body mass index is 21.47 kg/m².    Hearing good.    Dentition good  Alert, oriented in no acute distress.  Eye contact is good, speech goal directed, affect calm    Labs reviewed and discussed  Lab Results   Component Value Date/Time    CHOLSTRLTOT 214 (H) " 05/05/2022 07:57 AM     (H) 05/05/2022 07:57 AM    HDL 86 05/05/2022 07:57 AM    TRIGLYCERIDE 80 05/05/2022 07:57 AM       Lab Results   Component Value Date/Time    SODIUM 139 05/01/2021 08:38 AM    POTASSIUM 4.2 05/01/2021 08:38 AM    CHLORIDE 105 05/01/2021 08:38 AM    CO2 24 05/01/2021 08:38 AM    GLUCOSE 89 05/01/2021 08:38 AM    BUN 10 05/01/2021 08:38 AM    CREATININE 0.79 05/01/2021 08:38 AM     Lab Results   Component Value Date/Time    ALKPHOSPHAT 68 05/01/2021 08:38 AM    ASTSGOT 22 05/01/2021 08:38 AM    ALTSGPT 20 05/01/2021 08:38 AM    TBILIRUBIN 0.2 05/01/2021 08:38 AM      No results found for: HBA1C  No results found for: TSH  Lab Results   Component Value Date/Time    FREET4 1.40 05/01/2021 08:38 AM    FREET4 1.28 04/24/2020 07:02 AM     Lab Results   Component Value Date/Time    WBC 5.4 05/05/2022 07:57 AM    RBC 4.34 05/05/2022 07:57 AM    HEMOGLOBIN 13.6 05/05/2022 07:57 AM    HEMATOCRIT 41.3 05/05/2022 07:57 AM    MCV 95.2 05/05/2022 07:57 AM    MCH 31.3 05/05/2022 07:57 AM    MCHC 32.9 (L) 05/05/2022 07:57 AM    MPV 11.1 05/05/2022 07:57 AM    NEUTSPOLYS 45.20 05/05/2022 07:57 AM    LYMPHOCYTES 35.80 05/05/2022 07:57 AM    MONOCYTES 10.90 05/05/2022 07:57 AM    EOSINOPHILS 5.90 05/05/2022 07:57 AM    BASOPHILS 2.00 (H) 05/05/2022 07:57 AM      Assessment and Plan. The following treatment and monitoring plan is recommended:      1. Medicare annual wellness visit, subsequent  Reviewed PMH, PSH, FH, SH, ALL, MEDS, HCM/IMM.   - Subsequent Annual Wellness Visit - Includes PPPS ()    2. Health care maintenance  Per HPI  - Subsequent Annual Wellness Visit - Includes PPPS ()    3. Encounter for screening mammogram for malignant neoplasm of breast  - Subsequent Annual Wellness Visit - Includes PPPS ()  - MA-SCREENING MAMMO BILAT W/TOMOSYNTHESIS W/CAD; Future    4. SVT (supraventricular tachycardia) (HCC)  Intermittent, no recent sx, no meds  - Subsequent Annual Wellness Visit -  Includes PPPS ()    5. Palpitations  - Subsequent Annual Wellness Visit - Includes PPPS ()  - TSH WITH REFLEX TO FT4; Future  - ALPRAZolam (XANAX) 0.25 MG Tab; TAKE 1 TABLET BY MOUTH AT BEDTIME AS NEEDED FOR SLEEP  Dispense: 30 Tablet; Refill: 1    6. Dyslipidemia   Borderline, high HDL, no medications; discussed treatment options, advised low-calorie diet exercise, weight control  - Subsequent Annual Wellness Visit - Includes PPPS ()  - Comp Metabolic Panel; Future  - Lipid Profile; Future    7. Essential tremor  - Controlled, continue with current management.  - Subsequent Annual Wellness Visit - Includes PPPS ()  - ALPRAZolam (XANAX) 0.25 MG Tab; TAKE 1 TABLET BY MOUTH AT BEDTIME AS NEEDED FOR SLEEP  Dispense: 30 Tablet; Refill: 1    8. Acquired hypothyroidism  - Controlled, continue with current management.  - Subsequent Annual Wellness Visit - Includes PPPS ()  - TSH WITH REFLEX TO FT4; Future  - levothyroxine (SYNTHROID) 75 MCG Tab; Take 1 Tablet by mouth every morning before breakfast.  Dispense: 100 Tablet; Refill: 3  - US-THYROID; Future    9. Fatigue, unspecified type   -Follow-up labs  - Subsequent Annual Wellness Visit - Includes PPPS ()  - CBC WITH DIFFERENTIAL; Future  - TSH WITH REFLEX TO FT4; Future    10. Menopause  - Subsequent Annual Wellness Visit - Includes PPPS ()  - DS-BONE DENSITY STUDY (DEXA); Future    Services suggested: No services needed at this time  Health Care Screening: Age-appropriate preventive services recommended by USPTF and ACIP covered by Medicare were discussed today. Services ordered if indicated and agreed upon by the patient.  Referrals offered: Community-based lifestyle interventions to reduce health risks and promote self-management and wellness, fall prevention, nutrition, physical activity, tobacco-use cessation, weight loss, and mental health services as per orders if indicated.    Discussion today about general wellness and  lifestyle habits:    · Prevent falls and reduce trip hazards; Cautioned about securing or removing rugs.  · Have a working fire alarm and carbon monoxide detector;   · Engage in regular physical activity and social activities     Follow-up: Return in about 1 year (around 5/11/2023) for LABS, ANNUAL, HEALTH .

## 2022-06-30 ENCOUNTER — HOSPITAL ENCOUNTER (OUTPATIENT)
Dept: RADIOLOGY | Facility: MEDICAL CENTER | Age: 72
End: 2022-06-30
Attending: INTERNAL MEDICINE
Payer: MEDICARE

## 2022-06-30 DIAGNOSIS — Z12.31 ENCOUNTER FOR SCREENING MAMMOGRAM FOR MALIGNANT NEOPLASM OF BREAST: ICD-10-CM

## 2022-06-30 DIAGNOSIS — E03.9 ACQUIRED HYPOTHYROIDISM: ICD-10-CM

## 2022-06-30 PROCEDURE — 76536 US EXAM OF HEAD AND NECK: CPT

## 2022-06-30 PROCEDURE — 77063 BREAST TOMOSYNTHESIS BI: CPT

## 2022-08-25 ENCOUNTER — HOSPITAL ENCOUNTER (OUTPATIENT)
Dept: RADIOLOGY | Facility: MEDICAL CENTER | Age: 72
End: 2022-08-25
Attending: INTERNAL MEDICINE
Payer: MEDICARE

## 2022-08-25 DIAGNOSIS — Z78.0 MENOPAUSE: ICD-10-CM

## 2022-08-25 PROCEDURE — 77080 DXA BONE DENSITY AXIAL: CPT

## 2022-10-21 ENCOUNTER — TELEPHONE (OUTPATIENT)
Dept: SCHEDULING | Facility: IMAGING CENTER | Age: 72
End: 2022-10-21

## 2022-11-09 ENCOUNTER — DOCUMENTATION (OUTPATIENT)
Dept: HEALTH INFORMATION MANAGEMENT | Facility: OTHER | Age: 72
End: 2022-11-09
Payer: MEDICARE

## 2023-01-13 ENCOUNTER — OFFICE VISIT (OUTPATIENT)
Dept: MEDICAL GROUP | Facility: PHYSICIAN GROUP | Age: 73
End: 2023-01-13
Payer: MEDICARE

## 2023-01-13 VITALS
HEIGHT: 66 IN | BODY MASS INDEX: 21.13 KG/M2 | TEMPERATURE: 98.3 F | OXYGEN SATURATION: 100 % | SYSTOLIC BLOOD PRESSURE: 104 MMHG | DIASTOLIC BLOOD PRESSURE: 60 MMHG | HEART RATE: 76 BPM | WEIGHT: 131.5 LBS | RESPIRATION RATE: 18 BRPM

## 2023-01-13 DIAGNOSIS — Z13.1 ENCOUNTER FOR SCREENING FOR DIABETES MELLITUS: ICD-10-CM

## 2023-01-13 DIAGNOSIS — Z13.0 SCREENING FOR DEFICIENCY ANEMIA: ICD-10-CM

## 2023-01-13 DIAGNOSIS — E78.5 DYSLIPIDEMIA: ICD-10-CM

## 2023-01-13 DIAGNOSIS — E03.9 ACQUIRED HYPOTHYROIDISM: ICD-10-CM

## 2023-01-13 PROBLEM — I47.10 SVT (SUPRAVENTRICULAR TACHYCARDIA) (HCC): Status: RESOLVED | Noted: 2020-05-11 | Resolved: 2023-01-13

## 2023-01-13 PROCEDURE — 99214 OFFICE O/P EST MOD 30 MIN: CPT | Performed by: NURSE PRACTITIONER

## 2023-01-13 RX ORDER — ALPRAZOLAM 0.25 MG/1
0.25 TABLET ORAL
COMMUNITY
Start: 2022-10-22 | End: 2023-05-12

## 2023-01-13 ASSESSMENT — PATIENT HEALTH QUESTIONNAIRE - PHQ9: CLINICAL INTERPRETATION OF PHQ2 SCORE: 0

## 2023-01-13 ASSESSMENT — FIBROSIS 4 INDEX: FIB4 SCORE: 1.06

## 2023-01-13 NOTE — ASSESSMENT & PLAN NOTE
"Chronic, ongoing.  Not taking any cholesterol lowering medications.  Reports diet is \"healthy\".   She is following a low-cholesterol diet.  She is exercising regularly.  She is not taking ASA daily.  She denies dizziness, claudication, or chest pain.  Due for updated lab work May 2023.     04/24/20 07:02 05/01/21 08:38 05/05/22 07:57   Cholesterol,Tot 217 (H) 204 (H) 214 (H)   Triglycerides 81 63 80   HDL 99 98 86    (H) 93 112 (H)     "

## 2023-01-13 NOTE — ASSESSMENT & PLAN NOTE
Currently taking levothyroxine 75 mcg daily as prescribed.   Denies symptoms including mood changes, anxiety/depression, chest pain/pressure, palpitations, fatigue, heat/cold intolerance, polydipsia, polyuria, diarrhea/constipation, abdominal pain, unexpected weight change, skin changes, hair thickening/coarsening/falling out/thinning, brittle nails, or edema.  Due for updated lab work May 2023.     04/24/20 07:02 05/01/21 08:38 05/05/22 07:57   TSH 1.190 1.220 0.630   Free T-4 1.28 1.40

## 2023-01-13 NOTE — PROGRESS NOTES
"Subjective  Chief Complaint  Establish care to manage her chronic conditions    History of Present Illness  Lina Freeman is a 72 y.o. female. This patient is here today to establish care.  Her prior PCP was Reynold Greenberg  .  Dyslipidemia  Chronic, ongoing.  Not taking any cholesterol lowering medications.  Reports diet is \"healthy\".   She is following a low-cholesterol diet.  She is exercising regularly.  She is not taking ASA daily.  She denies dizziness, claudication, or chest pain.  Due for updated lab work May 2023.     04/24/20 07:02 05/01/21 08:38 05/05/22 07:57   Cholesterol,Tot 217 (H) 204 (H) 214 (H)   Triglycerides 81 63 80   HDL 99 98 86    (H) 93 112 (H)       Acquired hypothyroidism  Currently taking levothyroxine 75 mcg daily as prescribed.   Denies symptoms including mood changes, anxiety/depression, chest pain/pressure, palpitations, fatigue, heat/cold intolerance, polydipsia, polyuria, diarrhea/constipation, abdominal pain, unexpected weight change, skin changes, hair thickening/coarsening/falling out/thinning, brittle nails, or edema.  Due for updated lab work May 2023.     04/24/20 07:02 05/01/21 08:38 05/05/22 07:57   TSH 1.190 1.220 0.630   Free T-4 1.28 1.40      Past Medical History    Allergies: Patient has no known allergies.  Past Medical History:   Diagnosis Date    Hyperlipidemia     Hypothyroidism     Osteopenia     Osteopenia     SVT (supraventricular tachycardia) ([facility]) 5/11/2020    Tremor      History reviewed. No pertinent surgical history.  Current Outpatient Medications Ordered in Epic   Medication Sig Dispense Refill    ALPRAZolam (XANAX) 0.25 MG Tab Take 0.25 mg by mouth at bedtime as needed.      CALCIUM PO Take  by mouth.      levothyroxine (SYNTHROID) 75 MCG Tab Take 1 Tablet by mouth every morning before breakfast. 100 Tablet 3    Multiple Vitamins-Minerals (MULTIVITAMIN ADULT) Tab Take 1 Tab by mouth every day.      Magnesium 400 MG Tab Take 1 Tab by " "mouth every day.      B Complex-C (SUPER B COMPLEX PO) Take 1 Tab by mouth every day.       No current [facility]-ordered facility-administered medications on file.     Family History:    Family History   Problem Relation Age of Onset    Cancer Mother         breast cancer    Stroke Mother         Probably mini strokes    Hypertension Mother         Rheumatic fever    Breast Cancer Mother     Thyroid Mother     Cancer Father         lung cancer    Thyroid Sister     No Known Problems Brother     Cancer Maternal Grandmother         breast    Breast Cancer Maternal Grandmother     Thyroid Sister     Thyroid Sister     No Known Problems Maternal Grandfather     No Known Problems Paternal Grandmother     No Known Problems Paternal Grandfather     No Known Problems Brother     Hyperlipidemia Neg Hx     Heart Disease Neg Hx       Personal/Social History:    Social History     Tobacco Use    Smoking status: Former     Packs/day: 1.00     Years: 20.00     Pack years: 20.00     Types: Cigarettes     Quit date: 1989     Years since quittin.9    Smokeless tobacco: Never   Vaping Use    Vaping Use: Never used   Substance Use Topics    Alcohol use: Yes     Alcohol/week: 1.0 oz     Types: 2 Cans of beer per week     Comment: 4-6 x week    Drug use: No     Social History     Social History Narrative    Not on file      Review of Systems:     General: Negative for fever/chills and unexpected weight change.    Respiratory:  Negative for cough and dyspnea.     Cardiovascular:  Negative for chest pain and palpitations.   Musculoskeletal:  Negative for myalgias.    Skin:  Negative for rash.     Objective  Physical Exam:   /60 (BP Location: Left arm, Patient Position: Sitting, BP Cuff Size: Adult)   Pulse 76   Temp 36.8 °C (98.3 °F) (Temporal)   Resp 18   Ht 1.676 m (5' 6\")   Wt 59.6 kg (131 lb 8 oz)   SpO2 100%  Body mass index is 21.22 kg/m².  General:  Alert and oriented.  Well appearing.  NAD.  Head:  Normocephalic. "   Neck: Supple without JVD. No lymphadenopathy.  Pulmonary:  Normal effort.  Clear to ausculation without rales, ronchi, or wheezing.  Cardiovascular:  Regular rate and rhythm without murmur, rubs or gallop.  Radial pulses are intact and equal bilaterally.  Musculoskeletal:  No extremity cyanosis, clubbing, or edema.  Skin:  Warm and dry.  No obvious lesions.  Psych: Normal mood and affect. Alert and oriented x3. Judgment and insight is normal.      Assessment/Plan   1. Acquired hypothyroidism  Chronic and ongoing.  Continue to take Levothyroxine 75 mcg every morning on an empty stomach.  Due for updated labs May 2023.  - TSH WITH REFLEX TO FT4; Future    2. Dyslipidemia  Chronic and ongoing.  Educated on a healthy diet and exercise.  Due for updated labs May 2023.  - Lipid Profile; Future    3. Encounter for screening for diabetes mellitus  Due for updated labs May 2023.  - Comp Metabolic Panel; Future    4. Screening for deficiency anemia  Due for updated labs May 2023.  - CBC WITH DIFFERENTIAL; Future      Health Maintenance: Completed    Return in about 4 months (around 5/13/2023) for F/U Labs.    Please note that this dictation was created using voice recognition software. I have made every reasonable attempt to correct obvious errors, but I expect that there are errors of grammar and possibly content that I did not discover before finalizing the note.    ALON Cervantes  [facility]

## 2023-02-07 ENCOUNTER — APPOINTMENT (RX ONLY)
Dept: URBAN - METROPOLITAN AREA CLINIC 22 | Facility: CLINIC | Age: 73
Setting detail: DERMATOLOGY
End: 2023-02-07

## 2023-02-07 DIAGNOSIS — D22 MELANOCYTIC NEVI: ICD-10-CM

## 2023-02-07 DIAGNOSIS — Z71.89 OTHER SPECIFIED COUNSELING: ICD-10-CM

## 2023-02-07 DIAGNOSIS — L81.4 OTHER MELANIN HYPERPIGMENTATION: ICD-10-CM

## 2023-02-07 DIAGNOSIS — L82.1 OTHER SEBORRHEIC KERATOSIS: ICD-10-CM

## 2023-02-07 PROBLEM — D22.5 MELANOCYTIC NEVI OF TRUNK: Status: ACTIVE | Noted: 2023-02-07

## 2023-02-07 PROCEDURE — 99213 OFFICE O/P EST LOW 20 MIN: CPT

## 2023-02-07 PROCEDURE — ? SUNSCREEN TREATMENT REGIMEN

## 2023-02-07 PROCEDURE — ? COUNSELING

## 2023-02-07 ASSESSMENT — LOCATION DETAILED DESCRIPTION DERM
LOCATION DETAILED: LEFT DISTAL PRETIBIAL REGION
LOCATION DETAILED: SUPERIOR THORACIC SPINE
LOCATION DETAILED: LEFT PROXIMAL CALF
LOCATION DETAILED: RIGHT VENTRAL PROXIMAL FOREARM
LOCATION DETAILED: RIGHT PROXIMAL CALF
LOCATION DETAILED: LEFT VENTRAL PROXIMAL FOREARM
LOCATION DETAILED: INFERIOR THORACIC SPINE
LOCATION DETAILED: INFERIOR MID FOREHEAD
LOCATION DETAILED: RIGHT DISTAL PRETIBIAL REGION

## 2023-02-07 ASSESSMENT — LOCATION ZONE DERM
LOCATION ZONE: ARM
LOCATION ZONE: LEG
LOCATION ZONE: TRUNK
LOCATION ZONE: FACE

## 2023-02-07 ASSESSMENT — LOCATION SIMPLE DESCRIPTION DERM
LOCATION SIMPLE: RIGHT FOREARM
LOCATION SIMPLE: LEFT PRETIBIAL REGION
LOCATION SIMPLE: RIGHT PRETIBIAL REGION
LOCATION SIMPLE: RIGHT CALF
LOCATION SIMPLE: LEFT CALF
LOCATION SIMPLE: LEFT FOREARM
LOCATION SIMPLE: INFERIOR FOREHEAD
LOCATION SIMPLE: UPPER BACK

## 2023-03-31 ENCOUNTER — OFFICE VISIT (OUTPATIENT)
Dept: MEDICAL GROUP | Facility: PHYSICIAN GROUP | Age: 73
End: 2023-03-31
Payer: MEDICARE

## 2023-03-31 VITALS
OXYGEN SATURATION: 99 % | DIASTOLIC BLOOD PRESSURE: 80 MMHG | RESPIRATION RATE: 20 BRPM | BODY MASS INDEX: 21.56 KG/M2 | WEIGHT: 134.13 LBS | HEIGHT: 66 IN | TEMPERATURE: 98.5 F | HEART RATE: 73 BPM | SYSTOLIC BLOOD PRESSURE: 112 MMHG

## 2023-03-31 DIAGNOSIS — Z02.89 ENCOUNTER FOR COMPLETION OF FORM WITH PATIENT: ICD-10-CM

## 2023-03-31 PROCEDURE — 99213 OFFICE O/P EST LOW 20 MIN: CPT | Performed by: NURSE PRACTITIONER

## 2023-03-31 ASSESSMENT — FIBROSIS 4 INDEX: FIB4 SCORE: 1.06

## 2023-03-31 NOTE — PROGRESS NOTES
Subjective  Chief Complaint  Paperwork    History of Present Illness  Lina Freeman is a 72 y.o. female. This established patient is here today to complete paperwork for the [employer].    Encounter for completion of form with patient  Faby is here today to have paperwork completed for the [employer].    Past Medical History    Allergies: Patient has no known allergies.  Past Medical History:   Diagnosis Date    Hyperlipidemia     Hypothyroidism     Osteopenia     Osteopenia     SVT (supraventricular tachycardia) ([facility]) 5/11/2020    Tremor      History reviewed. No pertinent surgical history.  Current Outpatient Medications Ordered in Epic   Medication Sig Dispense Refill    ALPRAZolam (XANAX) 0.25 MG Tab Take 0.25 mg by mouth at bedtime as needed.      CALCIUM PO Take  by mouth.      levothyroxine (SYNTHROID) 75 MCG Tab Take 1 Tablet by mouth every morning before breakfast. 100 Tablet 3    Multiple Vitamins-Minerals (MULTIVITAMIN ADULT) Tab Take 1 Tab by mouth every day.      Magnesium 400 MG Tab Take 1 Tab by mouth every day.      B Complex-C (SUPER B COMPLEX PO) Take 1 Tab by mouth every day.       No current [facility]-ordered facility-administered medications on file.     Family History:    Family History   Problem Relation Age of Onset    Cancer Mother         breast cancer    Stroke Mother         Probably mini strokes    Hypertension Mother         Rheumatic fever    Breast Cancer Mother     Thyroid Mother     Cancer Father         lung cancer    Thyroid Sister     No Known Problems Brother     Cancer Maternal Grandmother         breast    Breast Cancer Maternal Grandmother     Thyroid Sister     Thyroid Sister     No Known Problems Maternal Grandfather     No Known Problems Paternal Grandmother     No Known Problems Paternal Grandfather     No Known Problems Brother     Hyperlipidemia Neg Hx     Heart Disease Neg Hx       Personal/Social History:    Social History     Tobacco Use    Smoking status: Former      "Packs/day: 1.00     Years: 20.00     Pack years: 20.00     Types: Cigarettes     Quit date: 1989     Years since quittin.1    Smokeless tobacco: Never   Vaping Use    Vaping Use: Never used   Substance Use Topics    Alcohol use: Yes     Alcohol/week: 1.0 oz     Types: 2 Cans of beer per week     Comment: 4-6 x week    Drug use: No     Social History     Social History Narrative    Not on file      Review of Systems:   General: Negative for fever/chills and unexpected weight change.   Respiratory:  Negative for cough and dyspnea.    Cardiovascular:  Negative for chest pain and palpitations.  Musculoskeletal:  Negative for myalgias.   Skin:  Negative for rash.     Objective  Physical Exam:   /80 (BP Location: Right arm, Patient Position: Sitting, BP Cuff Size: Adult)   Pulse 73   Temp 36.9 °C (98.5 °F) (Temporal)   Resp 20   Ht 1.676 m (5' 6\")   Wt 60.8 kg (134 lb 2 oz)   SpO2 99%  Body mass index is 21.65 kg/m².  General:  Alert and oriented.  Well appearing.  NAD  Neck: Supple without JVD. No lymphadenopathy.  Pulmonary:  Normal effort.  Clear to ausculation without rales, ronchi, or wheezing.  Cardiovascular:  Regular rate and rhythm without murmur, rubs or gallop.   Skin:  Warm and dry.  No obvious lesions.  Musculoskeletal:  No extremity cyanosis, clubbing, or edema.      Assessment/Plan  1. Encounter for completion of form with patient  Faby is here today to have paperwork completed for the [employer]. Paperwork was completed and scanned into the patients chart. Paperwork was handed back to the patient.      Health Maintenance: Completed    Return if symptoms worsen or fail to improve.    I spent a total of 21 minutes with record review, exam, and communication with the patient, communication with other providers, and documentation of this encounter.    Discussed that the patient carries some responsibility in management of their health care.    Please note that this dictation was created using " voice recognition software. I have made every reasonable attempt to correct obvious errors, but I expect that there are errors of grammar and possibly content that I did not discover before finalizing the note.    ALON Cervantes  [facility]

## 2023-04-21 ENCOUNTER — HOSPITAL ENCOUNTER (OUTPATIENT)
Dept: LAB | Facility: MEDICAL CENTER | Age: 73
End: 2023-04-21
Attending: NURSE PRACTITIONER
Payer: MEDICARE

## 2023-04-21 DIAGNOSIS — E78.5 DYSLIPIDEMIA: ICD-10-CM

## 2023-04-21 DIAGNOSIS — E03.9 ACQUIRED HYPOTHYROIDISM: ICD-10-CM

## 2023-04-21 DIAGNOSIS — Z13.0 SCREENING FOR DEFICIENCY ANEMIA: ICD-10-CM

## 2023-04-21 DIAGNOSIS — Z13.1 ENCOUNTER FOR SCREENING FOR DIABETES MELLITUS: ICD-10-CM

## 2023-04-21 LAB
ALBUMIN SERPL BCP-MCNC: 4 G/DL (ref 3.2–4.9)
ALBUMIN/GLOB SERPL: 1.4 G/DL
ALP SERPL-CCNC: 66 U/L (ref 30–99)
ALT SERPL-CCNC: 18 U/L (ref 2–50)
ANION GAP SERPL CALC-SCNC: 9 MMOL/L (ref 7–16)
AST SERPL-CCNC: 19 U/L (ref 12–45)
BASOPHILS # BLD AUTO: 1.6 % (ref 0–1.8)
BASOPHILS # BLD: 0.07 K/UL (ref 0–0.12)
BILIRUB SERPL-MCNC: 0.3 MG/DL (ref 0.1–1.5)
BUN SERPL-MCNC: 11 MG/DL (ref 8–22)
CALCIUM ALBUM COR SERPL-MCNC: 9.2 MG/DL (ref 8.5–10.5)
CALCIUM SERPL-MCNC: 9.2 MG/DL (ref 8.5–10.5)
CHLORIDE SERPL-SCNC: 104 MMOL/L (ref 96–112)
CHOLEST SERPL-MCNC: 216 MG/DL (ref 100–199)
CO2 SERPL-SCNC: 24 MMOL/L (ref 20–33)
CREAT SERPL-MCNC: 0.84 MG/DL (ref 0.5–1.4)
EOSINOPHIL # BLD AUTO: 0.21 K/UL (ref 0–0.51)
EOSINOPHIL NFR BLD: 4.8 % (ref 0–6.9)
ERYTHROCYTE [DISTWIDTH] IN BLOOD BY AUTOMATED COUNT: 45.5 FL (ref 35.9–50)
FASTING STATUS PATIENT QL REPORTED: NORMAL
GFR SERPLBLD CREATININE-BSD FMLA CKD-EPI: 74 ML/MIN/1.73 M 2
GLOBULIN SER CALC-MCNC: 2.9 G/DL (ref 1.9–3.5)
GLUCOSE SERPL-MCNC: 94 MG/DL (ref 65–99)
HCT VFR BLD AUTO: 41.9 % (ref 37–47)
HDLC SERPL-MCNC: 93 MG/DL
HGB BLD-MCNC: 14 G/DL (ref 12–16)
IMM GRANULOCYTES # BLD AUTO: 0.01 K/UL (ref 0–0.11)
IMM GRANULOCYTES NFR BLD AUTO: 0.2 % (ref 0–0.9)
LDLC SERPL CALC-MCNC: 106 MG/DL
LYMPHOCYTES # BLD AUTO: 1.76 K/UL (ref 1–4.8)
LYMPHOCYTES NFR BLD: 40.3 % (ref 22–41)
MCH RBC QN AUTO: 31 PG (ref 27–33)
MCHC RBC AUTO-ENTMCNC: 33.4 G/DL (ref 33.6–35)
MCV RBC AUTO: 92.7 FL (ref 81.4–97.8)
MONOCYTES # BLD AUTO: 0.45 K/UL (ref 0–0.85)
MONOCYTES NFR BLD AUTO: 10.3 % (ref 0–13.4)
NEUTROPHILS # BLD AUTO: 1.87 K/UL (ref 2–7.15)
NEUTROPHILS NFR BLD: 42.8 % (ref 44–72)
NRBC # BLD AUTO: 0 K/UL
NRBC BLD-RTO: 0 /100 WBC
PLATELET # BLD AUTO: 307 K/UL (ref 164–446)
PMV BLD AUTO: 11.6 FL (ref 9–12.9)
POTASSIUM SERPL-SCNC: 4.7 MMOL/L (ref 3.6–5.5)
PROT SERPL-MCNC: 6.9 G/DL (ref 6–8.2)
RBC # BLD AUTO: 4.52 M/UL (ref 4.2–5.4)
SODIUM SERPL-SCNC: 137 MMOL/L (ref 135–145)
TRIGL SERPL-MCNC: 83 MG/DL (ref 0–149)
TSH SERPL DL<=0.005 MIU/L-ACNC: 0.97 UIU/ML (ref 0.38–5.33)
WBC # BLD AUTO: 4.4 K/UL (ref 4.8–10.8)

## 2023-04-21 PROCEDURE — 85025 COMPLETE CBC W/AUTO DIFF WBC: CPT

## 2023-04-21 PROCEDURE — 36415 COLL VENOUS BLD VENIPUNCTURE: CPT

## 2023-04-21 PROCEDURE — 84443 ASSAY THYROID STIM HORMONE: CPT

## 2023-04-21 PROCEDURE — 80053 COMPREHEN METABOLIC PANEL: CPT

## 2023-04-21 PROCEDURE — 80061 LIPID PANEL: CPT

## 2023-05-10 SDOH — ECONOMIC STABILITY: HOUSING INSECURITY: IN THE LAST 12 MONTHS, HOW MANY PLACES HAVE YOU LIVED?: 1

## 2023-05-10 SDOH — ECONOMIC STABILITY: FOOD INSECURITY: WITHIN THE PAST 12 MONTHS, YOU WORRIED THAT YOUR FOOD WOULD RUN OUT BEFORE YOU GOT MONEY TO BUY MORE.: PATIENT DECLINED

## 2023-05-10 SDOH — HEALTH STABILITY: PHYSICAL HEALTH: ON AVERAGE, HOW MANY DAYS PER WEEK DO YOU ENGAGE IN MODERATE TO STRENUOUS EXERCISE (LIKE A BRISK WALK)?: 3 DAYS

## 2023-05-10 SDOH — ECONOMIC STABILITY: INCOME INSECURITY: HOW HARD IS IT FOR YOU TO PAY FOR THE VERY BASICS LIKE FOOD, HOUSING, MEDICAL CARE, AND HEATING?: PATIENT DECLINED

## 2023-05-10 SDOH — HEALTH STABILITY: MENTAL HEALTH
STRESS IS WHEN SOMEONE FEELS TENSE, NERVOUS, ANXIOUS, OR CAN'T SLEEP AT NIGHT BECAUSE THEIR MIND IS TROUBLED. HOW STRESSED ARE YOU?: PATIENT DECLINED

## 2023-05-10 SDOH — HEALTH STABILITY: PHYSICAL HEALTH: ON AVERAGE, HOW MANY MINUTES DO YOU ENGAGE IN EXERCISE AT THIS LEVEL?: 40 MIN

## 2023-05-10 SDOH — ECONOMIC STABILITY: INCOME INSECURITY: IN THE LAST 12 MONTHS, WAS THERE A TIME WHEN YOU WERE NOT ABLE TO PAY THE MORTGAGE OR RENT ON TIME?: PATIENT REFUSED

## 2023-05-10 SDOH — ECONOMIC STABILITY: FOOD INSECURITY: WITHIN THE PAST 12 MONTHS, THE FOOD YOU BOUGHT JUST DIDN'T LAST AND YOU DIDN'T HAVE MONEY TO GET MORE.: PATIENT DECLINED

## 2023-05-10 ASSESSMENT — SOCIAL DETERMINANTS OF HEALTH (SDOH)
HOW OFTEN DO YOU GET TOGETHER WITH FRIENDS OR RELATIVES?: PATIENT DECLINED
WITHIN THE PAST 12 MONTHS, YOU WORRIED THAT YOUR FOOD WOULD RUN OUT BEFORE YOU GOT THE MONEY TO BUY MORE: PATIENT DECLINED
IN A TYPICAL WEEK, HOW MANY TIMES DO YOU TALK ON THE PHONE WITH FAMILY, FRIENDS, OR NEIGHBORS?: PATIENT DECLINED
DO YOU BELONG TO ANY CLUBS OR ORGANIZATIONS SUCH AS CHURCH GROUPS UNIONS, FRATERNAL OR ATHLETIC GROUPS, OR SCHOOL GROUPS?: PATIENT DECLINED
HOW OFTEN DO YOU ATTEND CHURCH OR RELIGIOUS SERVICES?: PATIENT DECLINED
HOW OFTEN DO YOU ATTENT MEETINGS OF THE CLUB OR ORGANIZATION YOU BELONG TO?: PATIENT DECLINED
HOW OFTEN DO YOU HAVE A DRINK CONTAINING ALCOHOL: PATIENT DECLINED
HOW MANY DRINKS CONTAINING ALCOHOL DO YOU HAVE ON A TYPICAL DAY WHEN YOU ARE DRINKING: PATIENT DECLINED
HOW HARD IS IT FOR YOU TO PAY FOR THE VERY BASICS LIKE FOOD, HOUSING, MEDICAL CARE, AND HEATING?: PATIENT DECLINED
HOW OFTEN DO YOU HAVE SIX OR MORE DRINKS ON ONE OCCASION: PATIENT DECLINED
HOW OFTEN DO YOU ATTENT MEETINGS OF THE CLUB OR ORGANIZATION YOU BELONG TO?: PATIENT DECLINED
HOW OFTEN DO YOU ATTEND CHURCH OR RELIGIOUS SERVICES?: PATIENT DECLINED
DO YOU BELONG TO ANY CLUBS OR ORGANIZATIONS SUCH AS CHURCH GROUPS UNIONS, FRATERNAL OR ATHLETIC GROUPS, OR SCHOOL GROUPS?: PATIENT DECLINED
HOW OFTEN DO YOU GET TOGETHER WITH FRIENDS OR RELATIVES?: PATIENT DECLINED
IN A TYPICAL WEEK, HOW MANY TIMES DO YOU TALK ON THE PHONE WITH FAMILY, FRIENDS, OR NEIGHBORS?: PATIENT DECLINED

## 2023-05-10 ASSESSMENT — LIFESTYLE VARIABLES
AUDIT-C TOTAL SCORE: -1
HOW OFTEN DO YOU HAVE SIX OR MORE DRINKS ON ONE OCCASION: PATIENT DECLINED
HOW MANY STANDARD DRINKS CONTAINING ALCOHOL DO YOU HAVE ON A TYPICAL DAY: PATIENT DECLINED
HOW OFTEN DO YOU HAVE A DRINK CONTAINING ALCOHOL: PATIENT DECLINED
SKIP TO QUESTIONS 9-10: 0

## 2023-05-12 ENCOUNTER — OFFICE VISIT (OUTPATIENT)
Dept: MEDICAL GROUP | Facility: PHYSICIAN GROUP | Age: 73
End: 2023-05-12
Payer: MEDICARE

## 2023-05-12 VITALS
HEART RATE: 71 BPM | DIASTOLIC BLOOD PRESSURE: 70 MMHG | TEMPERATURE: 97.8 F | HEIGHT: 66 IN | BODY MASS INDEX: 21.55 KG/M2 | WEIGHT: 134.1 LBS | OXYGEN SATURATION: 100 % | SYSTOLIC BLOOD PRESSURE: 118 MMHG

## 2023-05-12 DIAGNOSIS — E78.5 DYSLIPIDEMIA: ICD-10-CM

## 2023-05-12 DIAGNOSIS — Z00.00 MEDICARE ANNUAL WELLNESS VISIT, SUBSEQUENT: Primary | ICD-10-CM

## 2023-05-12 DIAGNOSIS — E03.9 ACQUIRED HYPOTHYROIDISM: ICD-10-CM

## 2023-05-12 DIAGNOSIS — R09.81 SINUS CONGESTION: ICD-10-CM

## 2023-05-12 DIAGNOSIS — R79.89 ABNORMAL CBC: ICD-10-CM

## 2023-05-12 PROCEDURE — G0439 PPPS, SUBSEQ VISIT: HCPCS | Performed by: NURSE PRACTITIONER

## 2023-05-12 PROCEDURE — 3074F SYST BP LT 130 MM HG: CPT | Performed by: NURSE PRACTITIONER

## 2023-05-12 PROCEDURE — 3078F DIAST BP <80 MM HG: CPT | Performed by: NURSE PRACTITIONER

## 2023-05-12 RX ORDER — LEVOTHYROXINE SODIUM 0.07 MG/1
75 TABLET ORAL
Qty: 100 TABLET | Refills: 3 | Status: SHIPPED | OUTPATIENT
Start: 2023-05-12

## 2023-05-12 ASSESSMENT — ENCOUNTER SYMPTOMS: GENERAL WELL-BEING: GOOD

## 2023-05-12 ASSESSMENT — ACTIVITIES OF DAILY LIVING (ADL): BATHING_REQUIRES_ASSISTANCE: 0

## 2023-05-12 ASSESSMENT — FIBROSIS 4 INDEX: FIB4 SCORE: 1.05

## 2023-05-12 ASSESSMENT — PATIENT HEALTH QUESTIONNAIRE - PHQ9: CLINICAL INTERPRETATION OF PHQ2 SCORE: 0

## 2023-05-12 NOTE — PROGRESS NOTES
Chief Complaint   Patient presents with    Annual Wellness Visit       HPI:  Lina Freeman is a 73 y.o. here for Medicare Annual Wellness Visit     Patient Active Problem List    Diagnosis Date Noted    Abnormal CBC 05/12/2023    Encounter for completion of form with patient 03/31/2023    Palpitations 05/11/2020    Health care maintenance 05/07/2019    Dyslipidemia 05/11/2018    Essential tremor 02/26/2016    Acquired hypothyroidism     Senile osteopenia        Current Outpatient Medications   Medication Sig Dispense Refill    levothyroxine (SYNTHROID) 75 MCG Tab Take 1 Tablet by mouth every morning before breakfast. 100 Tablet 3    CALCIUM PO Take  by mouth.      Multiple Vitamins-Minerals (MULTIVITAMIN ADULT) Tab Take 1 Tab by mouth every day.      Magnesium 400 MG Tab Take 1 Tab by mouth every day.      B Complex-C (SUPER B COMPLEX PO) Take 1 Tab by mouth every day.       No current facility-administered medications for this visit.          Current supplements as per medication list.     Allergies: Patient has no known allergies.    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 Her smoking use included cigarettes. She has a 20.00 pack-year smoking history. She has never used smokeless tobacco. She reports current alcohol use of about 1.0 oz of alcohol per week. She reports that she does not use drugs.  Counseling given: Not Answered      ROS:    Gait: Uses no assistive device  Ostomy: No  Other tubes: No  Amputations: No  Chronic oxygen use: No  Last eye exam: 06/2023  Wears hearing aids: No   : Denies any urinary leakage during the last 6 months    Screening:  Depression Screening  Little interest or pleasure in doing things?  0 - not at all  Feeling down, depressed , or hopeless? 0 - not at all  Patient Health Questionnaire Score: 0     If depressive symptoms identified deferred to follow up visit unless specifically  addressed in assessment and plan.    Interpretation of PHQ-9 Total Score   Score Severity   1-4 No Depression   5-9 Mild Depression   10-14 Moderate Depression   15-19 Moderately Severe Depression   20-27 Severe Depression    Screening for Cognitive Impairment  Three Minute Recall (daughter, heaven, mountain) 3/3    Gregory clock face with all 12 numbers and set the hands to show 10 past 11.       Cognitive concerns identified deferred for follow up unless specifically addressed in assessment and plan.    Fall Risk Assessment  Has the patient had two or more falls in the last year or any fall with injury in the last year?  No    Safety Assessment  Throw rugs on floor.  Yes  Handrails on all stairs.  No  Good lighting in all hallways.  Yes  Difficulty hearing.  No  Patient counseled about all safety risks that were identified.    Functional Assessment ADLs  Are there any barriers preventing you from cooking for yourself or meeting nutritional needs?  No.    Are there any barriers preventing you from driving safely or obtaining transportation?  No.    Are there any barriers preventing you from using a telephone or calling for help?  No.    Are there any barriers preventing you from shopping?  No.    Are there any barriers preventing you from taking care of your own finances?  No.    Are there any barriers preventing you from managing your medications?  No.    Are there any barriers preventing you from showering, bathing or dressing yourself?  No.    Are you currently engaging in any exercise or physical activity?  Yes.     What is your perception of your health?  Good    Advance Care Planning  Do you have an Advance Directive, Living Will, Durable Power of , or POLST? Yes    Living Will     is not on file - instructed patient to bring in a copy to scan into their chart      Health Maintenance Summary            Postponed - IMM INFLUENZA (Season Ended) Postponed until 1/13/2024      10/24/2020  Imm Admin:  Influenza Vaccine Adult HD    11/26/2019  Imm Admin: Influenza, Unspecified - HISTORICAL DATA    10/11/2018  Imm Admin: Influenza, Unspecified - HISTORICAL DATA    09/23/2017  Imm Admin: Influenza Vaccine Adult HD              Postponed - COVID-19 Vaccine (1) Postponed until 1/13/2024      No completion history exists for this topic.              Annual Wellness Visit (Every 366 Days) Next due on 5/12/2024 05/12/2023  Level of Service: ANNUAL WELLNESS VISIT-INCLUDES PPPS SUBSEQUE*    05/12/2023  Visit Dx: Medicare annual wellness visit, subsequent    05/11/2022  Subsequent Annual Wellness Visit - Includes PPPS ()    05/11/2022  Visit Dx: Medicare annual wellness visit, subsequent    05/05/2021  Subsequent Annual Wellness Visit - Includes PPPS ()    Only the first 5 history entries have been loaded, but more history exists.              MAMMOGRAM (Every 2 Years) Next due on 6/30/2024 06/30/2022  MA-SCREENING MAMMO BILAT W/TOMOSYNTHESIS W/CAD    06/25/2021  MA-DIAGNOSTIC MAMMO RIGHT W/TOMOSYNTHESIS W/O CAD    06/16/2021  MA-SCREENING MAMMO BILAT W/TOMOSYNTHESIS W/CAD    06/05/2020  MA-SCREENING MAMMO BILAT W/TOMOSYNTHESIS W/CAD    05/21/2019  MA-SCREENING MAMMO BILAT W/TOMOSYNTHESIS W/CAD    Only the first 5 history entries have been loaded, but more history exists.              COLORECTAL CANCER SCREENING (COLONOSCOPY - Every 10 Years) Next due on 6/8/2026 06/08/2016  AMB REFERRAL TO GI FOR COLONOSCOPY    02/07/2016  OCCULT BLOOD FECES IMMUNOASSAY              IMM DTaP/Tdap/Td Vaccine (2 - Td or Tdap) Next due on 5/10/2027      05/10/2017  Imm Admin: Tdap Vaccine              BONE DENSITY (Every 5 Years) Next due on 8/25/2027 08/25/2022  DS-BONE DENSITY STUDY (DEXA)    08/14/2020  DS-BONE DENSITY STUDY (DEXA)    07/24/2018  DS-BONE DENSITY STUDY (DEXA)    07/22/2016  DS-BONE DENSITY STUDY (DEXA)    01/15/2014  DS-BONE DENSITY STUDY (DEXA)    Only the first 5 history entries have been  loaded, but more history exists.              IMM ZOSTER VACCINES (Series Information) Completed      2019  Imm Admin: Zoster Vaccine Recombinant (RZV) (SHINGRIX)    2019  Imm Admin: Zoster Vaccine Recombinant (RZV) (SHINGRIX)              IMM PNEUMOCOCCAL VACCINE: 65+ Years (Series Information) Completed      10/29/2019  Imm Admin: Pneumococcal polysaccharide vaccine (PPSV-23)    10/26/2018  Imm Admin: Pneumococcal Conjugate Vaccine (Prevnar/PCV-13)              HEPATITIS C SCREENING  Completed      2021  HEP C VIRUS ANTIBODY              IMM HEP B VACCINE (Series Information) Aged Out      No completion history exists for this topic.              HPV Vaccines (Series Information) Aged Out      No completion history exists for this topic.              IMM MENINGOCOCCAL ACWY VACCINE (Series Information) Aged Out      No completion history exists for this topic.              Discontinued - CERVICAL CANCER SCREENING  Discontinued        Frequency changed to Never automatically (Topic No Longer Applies)    2017  PATHOLOGY GYN SPECIMEN    2017  THINPREP PAP, REFLEX HPV ON ASC-US AND ABOVE                    Patient Care Team:  BEN SimmonsPClaryRClaryNClary as PCP - General (Family Medicine)  Rosmery Flaherty M.D. as PCP - [facility] Paneled  Noé Thompson O.D. as Consulting Physician (Optometry)  Nate Turcios D.D.S. as Consulting Physician (Dentistry)        Social History     Tobacco Use    Smoking status: Former     Packs/day: 1.00     Years: 20.00     Pack years: 20.00     Types: Cigarettes     Quit date: 1989     Years since quittin.2    Smokeless tobacco: Never   Vaping Use    Vaping Use: Never used   Substance Use Topics    Alcohol use: Yes     Alcohol/week: 1.0 oz     Types: 2 Cans of beer per week     Comment: 4-6 x week    Drug use: No     Family History   Problem Relation Age of Onset    Cancer Mother         breast cancer    Stroke Mother         Probably mini strokes  "   Hypertension Mother         Rheumatic fever    Breast Cancer Mother     Thyroid Mother     Cancer Father         lung cancer    Thyroid Sister     No Known Problems Brother     Cancer Maternal Grandmother         breast    Breast Cancer Maternal Grandmother     Thyroid Sister     Thyroid Sister     No Known Problems Maternal Grandfather     No Known Problems Paternal Grandmother     No Known Problems Paternal Grandfather     No Known Problems Brother     Hyperlipidemia Neg Hx     Heart Disease Neg Hx      She  has a past medical history of Hyperlipidemia, Hypothyroidism, Osteopenia, Osteopenia, SVT (supraventricular tachycardia) (HCC) (5/11/2020), and Tremor.   History reviewed. No pertinent surgical history.    Exam:   /70   Pulse 71   Temp 36.6 °C (97.8 °F) (Temporal)   Ht 1.676 m (5' 6\")   Wt 60.8 kg (134 lb 1.6 oz)   SpO2 100%  Body mass index is 21.64 kg/m².    Hearing good.    Dentition good  Alert, oriented in no acute distress.  Eye contact is good, speech goal directed, affect calm    Assessment and Plan. The following treatment and monitoring plan is recommended:   1. Medicare annual wellness visit, subsequent    2. Acquired hypothyroidism  - levothyroxine (SYNTHROID) 75 MCG Tab; Take 1 Tablet by mouth every morning before breakfast.  Dispense: 100 Tablet; Refill: 3    3. Dyslipidemia    4. Abnormal CBC  - CBC WITH DIFFERENTIAL; Future      Services suggested: No services needed at this time  Health Care Screening: Age-appropriate preventive services recommended by USPTF and ACIP covered by Medicare were discussed today. Services ordered if indicated and agreed upon by the patient.  Referrals offered: Community-based lifestyle interventions to reduce health risks and promote self-management and wellness, fall prevention, nutrition, physical activity, tobacco-use cessation, weight loss, and mental health services as per orders if indicated.    Discussion today about general wellness and " lifestyle habits:    Prevent falls and reduce trip hazards; Cautioned about securing or removing rugs.  Have a working fire alarm and carbon monoxide detector;   Engage in regular physical activity and social activities     Follow-up: Return in about 1 year (around 5/12/2024), or if symptoms worsen or fail to improve.

## 2023-05-17 ENCOUNTER — DOCUMENTATION (OUTPATIENT)
Dept: HEALTH INFORMATION MANAGEMENT | Facility: OTHER | Age: 73
End: 2023-05-17
Payer: MEDICARE

## 2023-05-17 ENCOUNTER — PATIENT MESSAGE (OUTPATIENT)
Dept: HEALTH INFORMATION MANAGEMENT | Facility: OTHER | Age: 73
End: 2023-05-17

## 2023-05-19 RX ORDER — ALPRAZOLAM 0.25 MG/1
0.25 TABLET ORAL NIGHTLY PRN
COMMUNITY

## 2023-07-28 ENCOUNTER — HOSPITAL ENCOUNTER (OUTPATIENT)
Dept: LAB | Facility: MEDICAL CENTER | Age: 73
End: 2023-07-28
Attending: NURSE PRACTITIONER
Payer: MEDICARE

## 2023-07-28 DIAGNOSIS — R79.89 ABNORMAL CBC: ICD-10-CM

## 2023-07-28 LAB
BASOPHILS # BLD AUTO: 1.8 % (ref 0–1.8)
BASOPHILS # BLD: 0.1 K/UL (ref 0–0.12)
EOSINOPHIL # BLD AUTO: 0.32 K/UL (ref 0–0.51)
EOSINOPHIL NFR BLD: 5.7 % (ref 0–6.9)
ERYTHROCYTE [DISTWIDTH] IN BLOOD BY AUTOMATED COUNT: 44.6 FL (ref 35.9–50)
HCT VFR BLD AUTO: 39.6 % (ref 37–47)
HGB BLD-MCNC: 13.2 G/DL (ref 12–16)
IMM GRANULOCYTES # BLD AUTO: 0 K/UL (ref 0–0.11)
IMM GRANULOCYTES NFR BLD AUTO: 0 % (ref 0–0.9)
LYMPHOCYTES # BLD AUTO: 1.8 K/UL (ref 1–4.8)
LYMPHOCYTES NFR BLD: 32.1 % (ref 22–41)
MCH RBC QN AUTO: 30.8 PG (ref 27–33)
MCHC RBC AUTO-ENTMCNC: 33.3 G/DL (ref 32.2–35.5)
MCV RBC AUTO: 92.3 FL (ref 81.4–97.8)
MONOCYTES # BLD AUTO: 0.61 K/UL (ref 0–0.85)
MONOCYTES NFR BLD AUTO: 10.9 % (ref 0–13.4)
NEUTROPHILS # BLD AUTO: 2.78 K/UL (ref 1.82–7.42)
NEUTROPHILS NFR BLD: 49.5 % (ref 44–72)
NRBC # BLD AUTO: 0 K/UL
NRBC BLD-RTO: 0 /100 WBC (ref 0–0.2)
PLATELET # BLD AUTO: 310 K/UL (ref 164–446)
PMV BLD AUTO: 11.6 FL (ref 9–12.9)
RBC # BLD AUTO: 4.29 M/UL (ref 4.2–5.4)
WBC # BLD AUTO: 5.6 K/UL (ref 4.8–10.8)

## 2023-07-28 PROCEDURE — 36415 COLL VENOUS BLD VENIPUNCTURE: CPT

## 2023-07-28 PROCEDURE — 85025 COMPLETE CBC W/AUTO DIFF WBC: CPT

## 2023-10-03 ENCOUNTER — HOSPITAL ENCOUNTER (OUTPATIENT)
Dept: RADIOLOGY | Facility: MEDICAL CENTER | Age: 73
End: 2023-10-03
Attending: OBSTETRICS & GYNECOLOGY
Payer: MEDICARE

## 2023-10-03 ENCOUNTER — HOSPITAL ENCOUNTER (OUTPATIENT)
Dept: LAB | Facility: MEDICAL CENTER | Age: 73
End: 2023-10-03
Attending: OBSTETRICS & GYNECOLOGY
Payer: MEDICARE

## 2023-10-03 DIAGNOSIS — Z12.31 ENCOUNTER FOR MAMMOGRAM TO ESTABLISH BASELINE MAMMOGRAM: ICD-10-CM

## 2023-10-03 LAB
T3FREE SERPL-MCNC: 2.08 PG/ML (ref 2–4.4)
T4 FREE SERPL-MCNC: 1.64 NG/DL (ref 0.93–1.7)
TSH SERPL DL<=0.005 MIU/L-ACNC: 0.43 UIU/ML (ref 0.38–5.33)

## 2023-10-03 PROCEDURE — 77063 BREAST TOMOSYNTHESIS BI: CPT

## 2023-10-03 PROCEDURE — 84439 ASSAY OF FREE THYROXINE: CPT

## 2023-10-03 PROCEDURE — 36415 COLL VENOUS BLD VENIPUNCTURE: CPT

## 2023-10-03 PROCEDURE — 84481 FREE ASSAY (FT-3): CPT

## 2023-10-03 PROCEDURE — 84443 ASSAY THYROID STIM HORMONE: CPT

## 2023-10-25 ENCOUNTER — TELEPHONE (OUTPATIENT)
Dept: HEALTH INFORMATION MANAGEMENT | Facility: OTHER | Age: 73
End: 2023-10-25

## 2023-12-29 ENCOUNTER — HOSPITAL ENCOUNTER (OUTPATIENT)
Dept: RADIOLOGY | Facility: MEDICAL CENTER | Age: 73
End: 2023-12-29
Attending: OBSTETRICS & GYNECOLOGY
Payer: MEDICARE

## 2023-12-29 DIAGNOSIS — R92.30 BREAST DENSITY: ICD-10-CM

## 2023-12-29 PROCEDURE — 76641 ULTRASOUND BREAST COMPLETE: CPT

## 2024-02-14 ENCOUNTER — APPOINTMENT (RX ONLY)
Dept: URBAN - METROPOLITAN AREA CLINIC 22 | Facility: CLINIC | Age: 74
Setting detail: DERMATOLOGY
End: 2024-02-14

## 2024-02-14 DIAGNOSIS — L82.1 OTHER SEBORRHEIC KERATOSIS: ICD-10-CM

## 2024-02-14 DIAGNOSIS — L81.4 OTHER MELANIN HYPERPIGMENTATION: ICD-10-CM

## 2024-02-14 DIAGNOSIS — D22 MELANOCYTIC NEVI: ICD-10-CM

## 2024-02-14 DIAGNOSIS — Z71.89 OTHER SPECIFIED COUNSELING: ICD-10-CM

## 2024-02-14 DIAGNOSIS — I78.8 OTHER DISEASES OF CAPILLARIES: ICD-10-CM

## 2024-02-14 DIAGNOSIS — L44.8 OTHER SPECIFIED PAPULOSQUAMOUS DISORDERS: ICD-10-CM

## 2024-02-14 PROBLEM — D48.5 NEOPLASM OF UNCERTAIN BEHAVIOR OF SKIN: Status: ACTIVE | Noted: 2024-02-14

## 2024-02-14 PROBLEM — D22.5 MELANOCYTIC NEVI OF TRUNK: Status: ACTIVE | Noted: 2024-02-14

## 2024-02-14 PROCEDURE — 99213 OFFICE O/P EST LOW 20 MIN: CPT

## 2024-02-14 PROCEDURE — ? COUNSELING

## 2024-02-14 PROCEDURE — ? ADDITIONAL NOTES

## 2024-02-14 PROCEDURE — ? SUNSCREEN RECOMMENDATIONS

## 2024-02-14 ASSESSMENT — LOCATION SIMPLE DESCRIPTION DERM
LOCATION SIMPLE: RIGHT FOREARM
LOCATION SIMPLE: INFERIOR FOREHEAD
LOCATION SIMPLE: UPPER BACK
LOCATION SIMPLE: LEFT FOREARM
LOCATION SIMPLE: RIGHT HAND
LOCATION SIMPLE: NOSE

## 2024-02-14 ASSESSMENT — LOCATION DETAILED DESCRIPTION DERM
LOCATION DETAILED: LEFT VENTRAL PROXIMAL FOREARM
LOCATION DETAILED: RIGHT VENTRAL PROXIMAL FOREARM
LOCATION DETAILED: INFERIOR THORACIC SPINE
LOCATION DETAILED: INFERIOR MID FOREHEAD
LOCATION DETAILED: NASAL DORSUM
LOCATION DETAILED: RIGHT ULNAR DORSAL HAND
LOCATION DETAILED: SUPERIOR THORACIC SPINE

## 2024-02-14 ASSESSMENT — LOCATION ZONE DERM
LOCATION ZONE: HAND
LOCATION ZONE: TRUNK
LOCATION ZONE: NOSE
LOCATION ZONE: ARM
LOCATION ZONE: FACE

## 2024-02-14 NOTE — PROCEDURE: ADDITIONAL NOTES
Additional Notes: Discussed laser services with Norah Benavides
Render Risk Assessment In Note?: no
Detail Level: Simple

## 2024-02-14 NOTE — PROCEDURE: SUNSCREEN RECOMMENDATIONS

## 2024-03-21 ENCOUNTER — APPOINTMENT (OUTPATIENT)
Dept: MEDICAL GROUP | Facility: PHYSICIAN GROUP | Age: 74
End: 2024-03-21
Payer: MEDICARE

## 2024-05-12 SDOH — ECONOMIC STABILITY: TRANSPORTATION INSECURITY
IN THE PAST 12 MONTHS, HAS LACK OF RELIABLE TRANSPORTATION KEPT YOU FROM MEDICAL APPOINTMENTS, MEETINGS, WORK OR FROM GETTING THINGS NEEDED FOR DAILY LIVING?: NO

## 2024-05-12 SDOH — ECONOMIC STABILITY: FOOD INSECURITY: WITHIN THE PAST 12 MONTHS, YOU WORRIED THAT YOUR FOOD WOULD RUN OUT BEFORE YOU GOT MONEY TO BUY MORE.: PATIENT DECLINED

## 2024-05-12 SDOH — ECONOMIC STABILITY: TRANSPORTATION INSECURITY
IN THE PAST 12 MONTHS, HAS THE LACK OF TRANSPORTATION KEPT YOU FROM MEDICAL APPOINTMENTS OR FROM GETTING MEDICATIONS?: NO

## 2024-05-12 SDOH — HEALTH STABILITY: PHYSICAL HEALTH: ON AVERAGE, HOW MANY MINUTES DO YOU ENGAGE IN EXERCISE AT THIS LEVEL?: 60 MIN

## 2024-05-12 SDOH — ECONOMIC STABILITY: HOUSING INSECURITY: IN THE LAST 12 MONTHS, HOW MANY PLACES HAVE YOU LIVED?: 1

## 2024-05-12 SDOH — ECONOMIC STABILITY: INCOME INSECURITY: IN THE LAST 12 MONTHS, WAS THERE A TIME WHEN YOU WERE NOT ABLE TO PAY THE MORTGAGE OR RENT ON TIME?: PATIENT DECLINED

## 2024-05-12 SDOH — HEALTH STABILITY: PHYSICAL HEALTH: ON AVERAGE, HOW MANY DAYS PER WEEK DO YOU ENGAGE IN MODERATE TO STRENUOUS EXERCISE (LIKE A BRISK WALK)?: 3 DAYS

## 2024-05-12 SDOH — ECONOMIC STABILITY: HOUSING INSECURITY
IN THE LAST 12 MONTHS, WAS THERE A TIME WHEN YOU DID NOT HAVE A STEADY PLACE TO SLEEP OR SLEPT IN A SHELTER (INCLUDING NOW)?: PATIENT DECLINED

## 2024-05-12 SDOH — ECONOMIC STABILITY: TRANSPORTATION INSECURITY
IN THE PAST 12 MONTHS, HAS LACK OF TRANSPORTATION KEPT YOU FROM MEETINGS, WORK, OR FROM GETTING THINGS NEEDED FOR DAILY LIVING?: NO

## 2024-05-12 SDOH — ECONOMIC STABILITY: INCOME INSECURITY: HOW HARD IS IT FOR YOU TO PAY FOR THE VERY BASICS LIKE FOOD, HOUSING, MEDICAL CARE, AND HEATING?: PATIENT DECLINED

## 2024-05-12 ASSESSMENT — LIFESTYLE VARIABLES
HOW OFTEN DO YOU HAVE A DRINK CONTAINING ALCOHOL: 4 OR MORE TIMES A WEEK
AUDIT-C TOTAL SCORE: 4
HOW MANY STANDARD DRINKS CONTAINING ALCOHOL DO YOU HAVE ON A TYPICAL DAY: 1 OR 2
SKIP TO QUESTIONS 9-10: 1
HOW OFTEN DO YOU HAVE SIX OR MORE DRINKS ON ONE OCCASION: NEVER

## 2024-05-12 ASSESSMENT — SOCIAL DETERMINANTS OF HEALTH (SDOH)
HOW OFTEN DO YOU ATTEND CHURCH OR RELIGIOUS SERVICES?: MORE THAN 4 TIMES PER YEAR
HOW OFTEN DO YOU ATTENT MEETINGS OF THE CLUB OR ORGANIZATION YOU BELONG TO?: MORE THAN 4 TIMES PER YEAR
HOW OFTEN DO YOU ATTENT MEETINGS OF THE CLUB OR ORGANIZATION YOU BELONG TO?: MORE THAN 4 TIMES PER YEAR
DO YOU BELONG TO ANY CLUBS OR ORGANIZATIONS SUCH AS CHURCH GROUPS UNIONS, FRATERNAL OR ATHLETIC GROUPS, OR SCHOOL GROUPS?: YES
HOW OFTEN DO YOU HAVE A DRINK CONTAINING ALCOHOL: 4 OR MORE TIMES A WEEK
DO YOU BELONG TO ANY CLUBS OR ORGANIZATIONS SUCH AS CHURCH GROUPS UNIONS, FRATERNAL OR ATHLETIC GROUPS, OR SCHOOL GROUPS?: YES
WITHIN THE PAST 12 MONTHS, YOU WORRIED THAT YOUR FOOD WOULD RUN OUT BEFORE YOU GOT THE MONEY TO BUY MORE: PATIENT DECLINED
HOW OFTEN DO YOU GET TOGETHER WITH FRIENDS OR RELATIVES?: MORE THAN THREE TIMES A WEEK
HOW MANY DRINKS CONTAINING ALCOHOL DO YOU HAVE ON A TYPICAL DAY WHEN YOU ARE DRINKING: 1 OR 2
HOW OFTEN DO YOU ATTEND CHURCH OR RELIGIOUS SERVICES?: MORE THAN 4 TIMES PER YEAR
IN A TYPICAL WEEK, HOW MANY TIMES DO YOU TALK ON THE PHONE WITH FAMILY, FRIENDS, OR NEIGHBORS?: MORE THAN THREE TIMES A WEEK
HOW OFTEN DO YOU GET TOGETHER WITH FRIENDS OR RELATIVES?: MORE THAN THREE TIMES A WEEK
HOW OFTEN DO YOU HAVE SIX OR MORE DRINKS ON ONE OCCASION: NEVER
IN A TYPICAL WEEK, HOW MANY TIMES DO YOU TALK ON THE PHONE WITH FAMILY, FRIENDS, OR NEIGHBORS?: MORE THAN THREE TIMES A WEEK
HOW HARD IS IT FOR YOU TO PAY FOR THE VERY BASICS LIKE FOOD, HOUSING, MEDICAL CARE, AND HEATING?: PATIENT DECLINED

## 2024-05-13 ENCOUNTER — TELEPHONE (OUTPATIENT)
Dept: HEALTH INFORMATION MANAGEMENT | Facility: OTHER | Age: 74
End: 2024-05-13

## 2024-05-14 ENCOUNTER — OFFICE VISIT (OUTPATIENT)
Dept: MEDICAL GROUP | Facility: PHYSICIAN GROUP | Age: 74
End: 2024-05-14
Payer: MEDICARE

## 2024-05-14 VITALS
RESPIRATION RATE: 18 BRPM | WEIGHT: 131.13 LBS | SYSTOLIC BLOOD PRESSURE: 120 MMHG | DIASTOLIC BLOOD PRESSURE: 70 MMHG | HEIGHT: 65 IN | BODY MASS INDEX: 21.85 KG/M2 | HEART RATE: 78 BPM | TEMPERATURE: 98.2 F | OXYGEN SATURATION: 98 %

## 2024-05-14 DIAGNOSIS — Z13.228 SCREENING FOR ENDOCRINE, METABOLIC AND IMMUNITY DISORDER: ICD-10-CM

## 2024-05-14 DIAGNOSIS — Z13.29 SCREENING FOR ENDOCRINE, METABOLIC AND IMMUNITY DISORDER: ICD-10-CM

## 2024-05-14 DIAGNOSIS — E78.5 DYSLIPIDEMIA: ICD-10-CM

## 2024-05-14 DIAGNOSIS — G25.0 ESSENTIAL TREMOR: ICD-10-CM

## 2024-05-14 DIAGNOSIS — M85.80 SENILE OSTEOPENIA: ICD-10-CM

## 2024-05-14 DIAGNOSIS — Z13.0 SCREENING FOR ENDOCRINE, METABOLIC AND IMMUNITY DISORDER: ICD-10-CM

## 2024-05-14 DIAGNOSIS — E55.9 VITAMIN D DEFICIENCY: ICD-10-CM

## 2024-05-14 DIAGNOSIS — E03.9 ACQUIRED HYPOTHYROIDISM: ICD-10-CM

## 2024-05-14 DIAGNOSIS — M85.88 OTHER SPECIFIED DISORDERS OF BONE DENSITY AND STRUCTURE, OTHER SITE: ICD-10-CM

## 2024-05-14 PROBLEM — R00.2 PALPITATIONS: Status: RESOLVED | Noted: 2020-05-11 | Resolved: 2024-05-14

## 2024-05-14 PROCEDURE — 3078F DIAST BP <80 MM HG: CPT

## 2024-05-14 PROCEDURE — 99214 OFFICE O/P EST MOD 30 MIN: CPT

## 2024-05-14 PROCEDURE — 3074F SYST BP LT 130 MM HG: CPT

## 2024-05-14 ASSESSMENT — ENCOUNTER SYMPTOMS
COUGH: 0
PALPITATIONS: 0
TINGLING: 0
WEIGHT LOSS: 0
VOMITING: 0
WHEEZING: 0
CONSTIPATION: 0
NAUSEA: 0
DIARRHEA: 0
BLOOD IN STOOL: 0
FEVER: 0
SHORTNESS OF BREATH: 0
DIZZINESS: 0
HEADACHES: 0
CHILLS: 0
ABDOMINAL PAIN: 0

## 2024-05-14 ASSESSMENT — PATIENT HEALTH QUESTIONNAIRE - PHQ9: CLINICAL INTERPRETATION OF PHQ2 SCORE: 0

## 2024-05-14 ASSESSMENT — FIBROSIS 4 INDEX: FIB4 SCORE: 1.05

## 2024-05-14 NOTE — PROGRESS NOTES
"Subjective:     Chief Complaint   Patient presents with    Establish Care     NEIL QUINTERO is a 74 y.o. female, who is here today to establish care and discuss lab work. Her prior PCP was ALON Cervantes.    Problem   Dyslipidemia    Currently not taking any cholesterol-lowering medication.  Patient does not like the idea of taking medications daily.  Lab Results   Component Value Date/Time    CHOLSTRLTOT 216 (H) 04/21/2023 0759    TRIGLYCERIDE 83 04/21/2023 0759    HDL 93 04/21/2023 0759     (H) 04/21/2023 0759   Due for updated lab work.  ASCVD risk score unable to calculate in the system due to lacking information.  Calculation performed with MD Calc indicating 9.5%.  Interested in further evaluating this with a CT cardiac score.     Essential Tremor    History of tremors, patient states that they worsen or become more apparent with activity or situations that make her nervous.  When I discussed further with the patient if she has anxiety or panic attacks, she noted that this is a problem of the newer generations and that she just deals with this and does not have anxiety.  Patient has currently been using Xanax 0.25 mg for many years to help treat this and also uses this for situations that make her \"upset\".  According to PDMP she has had this last refilled back in 2022, patient states she uses this very sparingly.  It has been advised from a previous provider back in 2018, according to this patient chart, that this would not be refilled any further and she should be switched over to a noncontrolled substance to help manage these symptoms.   Discussed with patient that this is not a medication I would recommend due to her age/Beers criteria, her lack of use, and using it in inappropriate situations. Patient became very defensive and stated she doesn't abuse this medication. This provider reiterated that this a medication that cannot be safely prescribed to her if she is unwillingly to address " the main issue of either tremor and/or anxiety.   At this time, patient is unwillingly to further discuss her tremors.     Acquired Hypothyroidism    Taking levothyroxine 75 mcg daily on an empty stomach.  Patient has been taking this medication since 1999 when she was diagnosed with hypothyroidism.  Patient has been on this dosing for the last 5 years.  Patient denies any symptoms including palpitations, fatigue heat/cold intolerance, diarrhea/constipation, unexpected weight change, skin changes, hair loss, brittle nails or edema.  Patient's last lab work in May 2023 showed euthyroid.  Due for updated lab work     Senile Osteopenia    2022 DEXA: LS T -1.5, L femur T -2.3 , Hip Fx 3.4%   In office today there is some note of height loss.    Secondary Causes of Osteoporosis  Hyperthyroidism: Levothyroxine 75 mcg, due for updated lab work   Vit D deficiency: due for updated lab work    Treatment  Calcium: currently supplementing    Vit D: using multi vitamin  Weight-bearing exercise: yes, very active  Tobacco: quit in 1989  EtOH: counseled to quit  Rx medication: none     Palpitations (Resolved)   Allergies: Patient has no known allergies.    Current Outpatient Medications:     ALPRAZolam (XANAX) 0.25 MG Tab, Take 0.25 mg by mouth at bedtime as needed., Disp: , Rfl:     levothyroxine (SYNTHROID) 75 MCG Tab, Take 1 Tablet by mouth every morning before breakfast., Disp: 100 Tablet, Rfl: 3    CALCIUM PO, Take  by mouth., Disp: , Rfl:     Multiple Vitamins-Minerals (MULTIVITAMIN ADULT) Tab, Take 1 Tab by mouth every day., Disp: , Rfl:     B Complex-C (SUPER B COMPLEX PO), Take 1 Tab by mouth every day., Disp: , Rfl:      Review of Systems   Constitutional:  Negative for chills, fever and weight loss.   Respiratory:  Negative for cough, shortness of breath and wheezing.    Cardiovascular:  Negative for chest pain, palpitations and leg swelling.   Gastrointestinal:  Negative for abdominal pain, blood in stool, constipation,  "diarrhea, nausea and vomiting.   Genitourinary:  Negative for hematuria.   Skin:  Negative for itching and rash.   Neurological:  Negative for dizziness, tingling and headaches.     Health Maintenance: Completed    Objective:     /70 (BP Location: Left arm, Patient Position: Sitting, BP Cuff Size: Adult)   Pulse 78   Temp 36.8 °C (98.2 °F) (Temporal)   Resp 18   Ht 1.64 m (5' 4.57\")   Wt 59.5 kg (131 lb 2 oz)   SpO2 98%  Body mass index is 22.11 kg/m².    Physical Exam  Vitals reviewed.   Constitutional:       General: She is not in acute distress.     Appearance: Normal appearance. She is not ill-appearing.   Cardiovascular:      Rate and Rhythm: Normal rate and regular rhythm.      Heart sounds: Normal heart sounds.   Pulmonary:      Effort: Pulmonary effort is normal.      Breath sounds: Normal breath sounds.   Abdominal:      General: Abdomen is flat.      Palpations: Abdomen is soft.   Musculoskeletal:      Cervical back: Normal range of motion.   Skin:     General: Skin is warm and dry.   Neurological:      General: No focal deficit present.      Mental Status: She is alert and oriented to person, place, and time.   Psychiatric:         Mood and Affect: Mood normal.         Speech: Speech normal.         Behavior: Behavior normal.         Thought Content: Thought content normal.         Cognition and Memory: Cognition normal.         Judgment: Judgment normal.        Assessment & Plan:     The following plan was discussed through shared decision making with the patient:    1. Acquired hypothyroidism  Chronic, controlled.  Patient's last lab work a year ago was euthyroid.  Due for updated blood work patient will complete this in the next 1 to 2 months for further management and continuation of thyroid medication.  - TSH; Future  - FREE THYROXINE; Future  - TRIIDOTHYRONINE; Future    2. Essential tremor  Chronic, controlled/uncontrolled.  Unfortunately, since patient declined to discuss her tremors " further so I can appropriately manage this with other medications that are not within Beers criteria I cannot further prescribe any medication to further manage this.  Advised patient to stop taking this medication and that we will no longer be prescribed Xanax to her.  If she starts struggling with anxiety, I am more than happy to help her manage this with other medications (and/or therapy) that are not controlled substances that more effectively and safely manage her anxiety.    3. Dyslipidemia  Chronic, uncontrolled.  Patient is not currently taking any cholesterol-lowering medication.  Due for updated blood work which she will complete in the next 1 to 2 months.  [facility]  Patient also given an order for CT cardiac score so she can complete this with her evaluate risk.  Advised patient on her ASCVD risk score indicating that this is your risk of heart attack and stroke in the next 5 to 10 years.  Patient stated verbal understanding we will get updated lab and imaging.  - Lipid Profile; Future  - CT-CARDIAC SCORING; Future    4. Senile osteopenia  5. Other specified disorders of bone density and structure, other site  Chronic, uncontrolled.  Patient is currently supplementing with vitamin D and calcium as well as increasing weightbearing exercise.  Will complete updated bone density scan since it has been 2 years since her last DEXA scan showing osteopenia as well as having some height loss in office today.  [facility]  - DS-BONE DENSITY STUDY (DEXA); Future    6. Vitamin D deficiency  Chronic, uncontrolled.  Patient is currently supplementing with a multivitamin supplement.  Patient will complete updated labs.  - VITAMIN D,25 HYDROXY (DEFICIENCY); Future    7. Screening for endocrine, metabolic and immunity disorder  Patient establishing care today in office; due for updated lab work/screenings.  Will follow-up in [facility] with lab results.  Discussed  with patient that if there is a significant amount of lab abnormalities we will have a follow-up appointment and I will indicate this is in my follow-up message with lab results.  Patient stated verbal understanding.  - CBC WITH DIFFERENTIAL; Future  - Comp Metabolic Panel; Future  - HEMOGLOBIN A1C; Future    Return in about 4 months (around 9/4/2024) for Medicare Wellness.         Please note that this dictation was created using voice recognition software. I have made every reasonable attempt to correct obvious errors, but I expect that there are errors of grammar and possibly content that I did not discover before finalizing the note.    ALON Moon  [facility]  [facility]

## 2024-06-13 ENCOUNTER — HOSPITAL ENCOUNTER (OUTPATIENT)
Dept: LAB | Facility: MEDICAL CENTER | Age: 74
End: 2024-06-13
Payer: MEDICARE

## 2024-06-13 DIAGNOSIS — Z13.0 SCREENING FOR ENDOCRINE, METABOLIC AND IMMUNITY DISORDER: ICD-10-CM

## 2024-06-13 DIAGNOSIS — E55.9 VITAMIN D DEFICIENCY: ICD-10-CM

## 2024-06-13 DIAGNOSIS — Z13.228 SCREENING FOR ENDOCRINE, METABOLIC AND IMMUNITY DISORDER: ICD-10-CM

## 2024-06-13 DIAGNOSIS — E03.9 ACQUIRED HYPOTHYROIDISM: ICD-10-CM

## 2024-06-13 DIAGNOSIS — E78.5 DYSLIPIDEMIA: ICD-10-CM

## 2024-06-13 DIAGNOSIS — Z13.29 SCREENING FOR ENDOCRINE, METABOLIC AND IMMUNITY DISORDER: ICD-10-CM

## 2024-06-13 LAB
25(OH)D3 SERPL-MCNC: 73 NG/ML (ref 30–100)
ALBUMIN SERPL BCP-MCNC: 4 G/DL (ref 3.2–4.9)
ALBUMIN/GLOB SERPL: 1.3 G/DL
ALP SERPL-CCNC: 66 U/L (ref 30–99)
ALT SERPL-CCNC: 17 U/L (ref 2–50)
ANION GAP SERPL CALC-SCNC: 10 MMOL/L (ref 7–16)
AST SERPL-CCNC: 18 U/L (ref 12–45)
BASOPHILS # BLD AUTO: 1.5 % (ref 0–1.8)
BASOPHILS # BLD: 0.08 K/UL (ref 0–0.12)
BILIRUB SERPL-MCNC: 0.4 MG/DL (ref 0.1–1.5)
BUN SERPL-MCNC: 10 MG/DL (ref 8–22)
CALCIUM ALBUM COR SERPL-MCNC: 9.5 MG/DL (ref 8.5–10.5)
CALCIUM SERPL-MCNC: 9.5 MG/DL (ref 8.5–10.5)
CHLORIDE SERPL-SCNC: 101 MMOL/L (ref 96–112)
CHOLEST SERPL-MCNC: 209 MG/DL (ref 100–199)
CO2 SERPL-SCNC: 26 MMOL/L (ref 20–33)
CREAT SERPL-MCNC: 0.72 MG/DL (ref 0.5–1.4)
EOSINOPHIL # BLD AUTO: 0.19 K/UL (ref 0–0.51)
EOSINOPHIL NFR BLD: 3.5 % (ref 0–6.9)
ERYTHROCYTE [DISTWIDTH] IN BLOOD BY AUTOMATED COUNT: 47.8 FL (ref 35.9–50)
EST. AVERAGE GLUCOSE BLD GHB EST-MCNC: 108 MG/DL
GFR SERPLBLD CREATININE-BSD FMLA CKD-EPI: 88 ML/MIN/1.73 M 2
GLOBULIN SER CALC-MCNC: 3.1 G/DL (ref 1.9–3.5)
GLUCOSE SERPL-MCNC: 84 MG/DL (ref 65–99)
HBA1C MFR BLD: 5.4 % (ref 4–5.6)
HCT VFR BLD AUTO: 41.1 % (ref 37–47)
HDLC SERPL-MCNC: 95 MG/DL
HGB BLD-MCNC: 13.6 G/DL (ref 12–16)
IMM GRANULOCYTES # BLD AUTO: 0.01 K/UL (ref 0–0.11)
IMM GRANULOCYTES NFR BLD AUTO: 0.2 % (ref 0–0.9)
LDLC SERPL CALC-MCNC: 101 MG/DL
LYMPHOCYTES # BLD AUTO: 2.49 K/UL (ref 1–4.8)
LYMPHOCYTES NFR BLD: 46.2 % (ref 22–41)
MCH RBC QN AUTO: 31.2 PG (ref 27–33)
MCHC RBC AUTO-ENTMCNC: 33.1 G/DL (ref 32.2–35.5)
MCV RBC AUTO: 94.3 FL (ref 81.4–97.8)
MONOCYTES # BLD AUTO: 0.55 K/UL (ref 0–0.85)
MONOCYTES NFR BLD AUTO: 10.2 % (ref 0–13.4)
NEUTROPHILS # BLD AUTO: 2.07 K/UL (ref 1.82–7.42)
NEUTROPHILS NFR BLD: 38.4 % (ref 44–72)
NRBC # BLD AUTO: 0 K/UL
NRBC BLD-RTO: 0 /100 WBC (ref 0–0.2)
PLATELET # BLD AUTO: 322 K/UL (ref 164–446)
PMV BLD AUTO: 11.3 FL (ref 9–12.9)
POTASSIUM SERPL-SCNC: 4 MMOL/L (ref 3.6–5.5)
PROT SERPL-MCNC: 7.1 G/DL (ref 6–8.2)
RBC # BLD AUTO: 4.36 M/UL (ref 4.2–5.4)
SODIUM SERPL-SCNC: 137 MMOL/L (ref 135–145)
T3 SERPL-MCNC: 62.7 NG/DL (ref 60–181)
T4 FREE SERPL-MCNC: 1.5 NG/DL (ref 0.93–1.7)
TRIGL SERPL-MCNC: 67 MG/DL (ref 0–149)
TSH SERPL DL<=0.005 MIU/L-ACNC: 0.74 UIU/ML (ref 0.38–5.33)
WBC # BLD AUTO: 5.4 K/UL (ref 4.8–10.8)

## 2024-06-13 PROCEDURE — 84439 ASSAY OF FREE THYROXINE: CPT

## 2024-06-13 PROCEDURE — 82306 VITAMIN D 25 HYDROXY: CPT

## 2024-06-13 PROCEDURE — 80053 COMPREHEN METABOLIC PANEL: CPT

## 2024-06-13 PROCEDURE — 85025 COMPLETE CBC W/AUTO DIFF WBC: CPT

## 2024-06-13 PROCEDURE — 83036 HEMOGLOBIN GLYCOSYLATED A1C: CPT

## 2024-06-13 PROCEDURE — 84443 ASSAY THYROID STIM HORMONE: CPT

## 2024-06-13 PROCEDURE — 84480 ASSAY TRIIODOTHYRONINE (T3): CPT

## 2024-06-13 PROCEDURE — 80061 LIPID PANEL: CPT

## 2024-06-13 PROCEDURE — 36415 COLL VENOUS BLD VENIPUNCTURE: CPT

## 2024-06-19 DIAGNOSIS — E03.9 ACQUIRED HYPOTHYROIDISM: ICD-10-CM

## 2024-06-19 RX ORDER — LEVOTHYROXINE SODIUM 0.07 MG/1
75 TABLET ORAL
Qty: 100 TABLET | Refills: 3 | Status: SHIPPED | OUTPATIENT
Start: 2024-06-19 | End: 2024-06-20 | Stop reason: SDUPTHER

## 2024-06-20 DIAGNOSIS — E03.9 ACQUIRED HYPOTHYROIDISM: ICD-10-CM

## 2024-06-20 RX ORDER — LEVOTHYROXINE SODIUM 0.07 MG/1
75 TABLET ORAL
Qty: 100 TABLET | Refills: 3 | Status: SHIPPED | OUTPATIENT
Start: 2024-06-20

## 2024-06-21 ENCOUNTER — PATIENT OUTREACH (OUTPATIENT)
Dept: HEALTH INFORMATION MANAGEMENT | Facility: OTHER | Age: 74
End: 2024-06-21

## 2024-06-21 DIAGNOSIS — G25.0 ESSENTIAL TREMOR: ICD-10-CM

## 2024-06-21 DIAGNOSIS — E78.2 MIXED HYPERLIPIDEMIA: ICD-10-CM

## 2024-06-21 DIAGNOSIS — M85.80 SENILE OSTEOPENIA: ICD-10-CM

## 2024-06-21 DIAGNOSIS — E03.9 ACQUIRED HYPOTHYROIDISM: ICD-10-CM

## 2024-06-21 SDOH — ECONOMIC STABILITY: INCOME INSECURITY: IN THE LAST 12 MONTHS, WAS THERE A TIME WHEN YOU WERE NOT ABLE TO PAY THE MORTGAGE OR RENT ON TIME?: NO

## 2024-06-21 SDOH — ECONOMIC STABILITY: FOOD INSECURITY: WITHIN THE PAST 12 MONTHS, YOU WORRIED THAT YOUR FOOD WOULD RUN OUT BEFORE YOU GOT MONEY TO BUY MORE.: NEVER TRUE

## 2024-06-21 SDOH — ECONOMIC STABILITY: HOUSING INSECURITY
IN THE LAST 12 MONTHS, WAS THERE A TIME WHEN YOU DID NOT HAVE A STEADY PLACE TO SLEEP OR SLEPT IN A SHELTER (INCLUDING NOW)?: NO

## 2024-06-21 SDOH — ECONOMIC STABILITY: FOOD INSECURITY: WITHIN THE PAST 12 MONTHS, THE FOOD YOU BOUGHT JUST DIDN'T LAST AND YOU DIDN'T HAVE MONEY TO GET MORE.: NEVER TRUE

## 2024-06-21 SDOH — HEALTH STABILITY: PHYSICAL HEALTH: ON AVERAGE, HOW MANY DAYS PER WEEK DO YOU ENGAGE IN MODERATE TO STRENUOUS EXERCISE (LIKE A BRISK WALK)?: 7 DAYS

## 2024-06-21 SDOH — ECONOMIC STABILITY: HOUSING INSECURITY: IN THE LAST 12 MONTHS, HOW MANY PLACES HAVE YOU LIVED?: 1

## 2024-06-21 SDOH — HEALTH STABILITY: PHYSICAL HEALTH: ON AVERAGE, HOW MANY MINUTES DO YOU ENGAGE IN EXERCISE AT THIS LEVEL?: 80 MIN

## 2024-06-21 ASSESSMENT — LIFESTYLE VARIABLES
HOW OFTEN DO YOU HAVE SIX OR MORE DRINKS ON ONE OCCASION: NEVER
SKIP TO QUESTIONS 9-10: 1
HOW MANY STANDARD DRINKS CONTAINING ALCOHOL DO YOU HAVE ON A TYPICAL DAY: 1 OR 2
AUDIT-C TOTAL SCORE: 3
HOW OFTEN DO YOU HAVE A DRINK CONTAINING ALCOHOL: 2-3 TIMES A WEEK

## 2024-06-21 ASSESSMENT — SOCIAL DETERMINANTS OF HEALTH (SDOH)
HOW OFTEN DO YOU ATTENT MEETINGS OF THE CLUB OR ORGANIZATION YOU BELONG TO?: NEVER
HOW OFTEN DO YOU ATTEND CHURCH OR RELIGIOUS SERVICES?: MORE THAN 4 TIMES PER YEAR
IN A TYPICAL WEEK, HOW MANY TIMES DO YOU TALK ON THE PHONE WITH FAMILY, FRIENDS, OR NEIGHBORS?: MORE THAN THREE TIMES A WEEK
HOW OFTEN DO YOU GET TOGETHER WITH FRIENDS OR RELATIVES?: ONCE A WEEK
WITHIN THE LAST YEAR, HAVE TO BEEN RAPED OR FORCED TO HAVE ANY KIND OF SEXUAL ACTIVITY BY YOUR PARTNER OR EX-PARTNER?: NO
WITHIN THE LAST YEAR, HAVE YOU BEEN AFRAID OF YOUR PARTNER OR EX-PARTNER?: NO
WITHIN THE LAST YEAR, HAVE YOU BEEN HUMILIATED OR EMOTIONALLY ABUSED IN OTHER WAYS BY YOUR PARTNER OR EX-PARTNER?: NO
HOW HARD IS IT FOR YOU TO PAY FOR THE VERY BASICS LIKE FOOD, HOUSING, MEDICAL CARE, AND HEATING?: NOT HARD AT ALL
WITHIN THE LAST YEAR, HAVE YOU BEEN KICKED, HIT, SLAPPED, OR OTHERWISE PHYSICALLY HURT BY YOUR PARTNER OR EX-PARTNER?: NO
DO YOU BELONG TO ANY CLUBS OR ORGANIZATIONS SUCH AS CHURCH GROUPS UNIONS, FRATERNAL OR ATHLETIC GROUPS, OR SCHOOL GROUPS?: NO
IN THE PAST 12 MONTHS, HAS THE ELECTRIC, GAS, OIL, OR WATER COMPANY THREATENED TO SHUT OFF SERVICE IN YOUR HOME?: NO

## 2024-07-23 PROBLEM — E78.2 MIXED HYPERLIPIDEMIA: Status: ACTIVE | Noted: 2018-05-11

## 2024-07-23 ASSESSMENT — PATIENT HEALTH QUESTIONNAIRE - PHQ9: CLINICAL INTERPRETATION OF PHQ2 SCORE: 0

## 2024-08-26 ENCOUNTER — HOSPITAL ENCOUNTER (OUTPATIENT)
Dept: RADIOLOGY | Facility: MEDICAL CENTER | Age: 74
End: 2024-08-26
Payer: MEDICARE

## 2024-08-26 ENCOUNTER — HOSPITAL ENCOUNTER (OUTPATIENT)
Dept: RADIOLOGY | Facility: MEDICAL CENTER | Age: 74
End: 2024-08-26
Payer: COMMERCIAL

## 2024-08-26 DIAGNOSIS — E78.2 MIXED HYPERLIPIDEMIA: ICD-10-CM

## 2024-08-26 DIAGNOSIS — M85.88 OTHER SPECIFIED DISORDERS OF BONE DENSITY AND STRUCTURE, OTHER SITE: ICD-10-CM

## 2024-08-26 DIAGNOSIS — M85.80 SENILE OSTEOPENIA: ICD-10-CM

## 2024-08-26 PROCEDURE — 77080 DXA BONE DENSITY AXIAL: CPT

## 2024-08-26 PROCEDURE — 4410556 CT-CARDIAC SCORING (SELF PAY ONLY)

## 2024-09-04 DIAGNOSIS — Z87.898 HISTORY OF PALPITATIONS: ICD-10-CM

## 2024-09-19 ENCOUNTER — PATIENT OUTREACH (OUTPATIENT)
Dept: MEDICAL GROUP | Facility: PHYSICIAN GROUP | Age: 74
End: 2024-09-19

## 2024-09-19 DIAGNOSIS — G25.0 ESSENTIAL TREMOR: ICD-10-CM

## 2024-09-19 DIAGNOSIS — Z87.898 HISTORY OF PALPITATIONS: ICD-10-CM

## 2024-09-19 DIAGNOSIS — E78.2 MIXED HYPERLIPIDEMIA: ICD-10-CM

## 2024-10-23 NOTE — PROGRESS NOTES
Left message - awaiting call back upcoming appt with Cardiology 10/29/2024 Dr. Ngo at 1115    Assessment     Monthly outreach completed with Faby. She is doing well. She has recently seen Cardiology and Dr. Ngo started her on low dose statin. Denies breathing concerns. She does not have any questions or needs at this time. Completed annual case review and discussed CCM enrollment w/[facility]. She feels confident in her ability to manage her conditions and would be agreeable to d/c General Care Management. Patient graduated from the General Care Management program at this time. Patient would like to concentrate on spouse's needs at this time as she can manage her own health concerns. She does have my phone number if any need arises.      Education     N/A     Plan of Care and Goals     Goals met     Barriers:     N/A     Progress:     Pt graduated     Next outreach: Pt graduated

## 2024-10-28 ASSESSMENT — ENCOUNTER SYMPTOMS
DIZZINESS: 0
PALPITATIONS: 0
FEVER: 0
WEAKNESS: 0
WHEEZING: 0
FOCAL WEAKNESS: 0
PND: 0
VOMITING: 0
ORTHOPNEA: 0
DIARRHEA: 0
SYNCOPE: 0
IRREGULAR HEARTBEAT: 0
NAUSEA: 0
DYSPNEA ON EXERTION: 0
COUGH: 0
ABDOMINAL PAIN: 0
NIGHT SWEATS: 0
NEAR-SYNCOPE: 0
SHORTNESS OF BREATH: 0

## 2024-10-29 ENCOUNTER — OFFICE VISIT (OUTPATIENT)
Dept: CARDIOLOGY | Facility: MEDICAL CENTER | Age: 74
End: 2024-10-29
Attending: STUDENT IN AN ORGANIZED HEALTH CARE EDUCATION/TRAINING PROGRAM
Payer: MEDICARE

## 2024-10-29 VITALS
HEART RATE: 58 BPM | DIASTOLIC BLOOD PRESSURE: 70 MMHG | RESPIRATION RATE: 15 BRPM | SYSTOLIC BLOOD PRESSURE: 110 MMHG | WEIGHT: 130 LBS | BODY MASS INDEX: 21.66 KG/M2 | OXYGEN SATURATION: 97 % | HEIGHT: 65 IN

## 2024-10-29 DIAGNOSIS — E78.5 DYSLIPIDEMIA: ICD-10-CM

## 2024-10-29 DIAGNOSIS — I35.1 NONRHEUMATIC AORTIC VALVE INSUFFICIENCY: ICD-10-CM

## 2024-10-29 DIAGNOSIS — I47.10 PAROXYSMAL SVT (SUPRAVENTRICULAR TACHYCARDIA) (HCC): ICD-10-CM

## 2024-10-29 DIAGNOSIS — R94.31 ABNORMAL EKG: ICD-10-CM

## 2024-10-29 DIAGNOSIS — R93.1 ELEVATED CORONARY ARTERY CALCIUM SCORE: ICD-10-CM

## 2024-10-29 LAB — EKG IMPRESSION: NORMAL

## 2024-10-29 PROCEDURE — 93005 ELECTROCARDIOGRAM TRACING: CPT | Performed by: STUDENT IN AN ORGANIZED HEALTH CARE EDUCATION/TRAINING PROGRAM

## 2024-10-29 PROCEDURE — 99211 OFF/OP EST MAY X REQ PHY/QHP: CPT | Performed by: STUDENT IN AN ORGANIZED HEALTH CARE EDUCATION/TRAINING PROGRAM

## 2024-10-29 RX ORDER — ASPIRIN 81 MG/1
81 TABLET ORAL DAILY
COMMUNITY

## 2024-10-29 ASSESSMENT — FIBROSIS 4 INDEX: FIB4 SCORE: 0.99

## 2024-10-30 ENCOUNTER — HOSPITAL ENCOUNTER (OUTPATIENT)
Dept: LAB | Facility: MEDICAL CENTER | Age: 74
End: 2024-10-30
Attending: STUDENT IN AN ORGANIZED HEALTH CARE EDUCATION/TRAINING PROGRAM
Payer: MEDICARE

## 2024-10-30 DIAGNOSIS — E78.5 DYSLIPIDEMIA: ICD-10-CM

## 2024-10-30 DIAGNOSIS — R93.1 ELEVATED CORONARY ARTERY CALCIUM SCORE: ICD-10-CM

## 2024-10-30 PROCEDURE — 36415 COLL VENOUS BLD VENIPUNCTURE: CPT

## 2024-10-30 PROCEDURE — 80061 LIPID PANEL: CPT

## 2024-10-31 LAB
CHOLEST SERPL-MCNC: 228 MG/DL (ref 100–199)
FASTING STATUS PATIENT QL REPORTED: NORMAL
HDLC SERPL-MCNC: 91 MG/DL
LDLC SERPL CALC-MCNC: 121 MG/DL
TRIGL SERPL-MCNC: 80 MG/DL (ref 0–149)

## 2024-11-05 ENCOUNTER — PATIENT MESSAGE (OUTPATIENT)
Dept: CARDIOLOGY | Facility: MEDICAL CENTER | Age: 74
End: 2024-11-05
Payer: MEDICARE

## 2024-11-05 ENCOUNTER — TELEPHONE (OUTPATIENT)
Dept: CARDIOLOGY | Facility: MEDICAL CENTER | Age: 74
End: 2024-11-05
Payer: MEDICARE

## 2024-11-05 DIAGNOSIS — E78.5 DYSLIPIDEMIA: ICD-10-CM

## 2024-11-05 NOTE — TELEPHONE ENCOUNTER
Rivera Ngo M.D. -->  Rebeca Harris R.N.  Please let her know her lipid levels are elevated and we recommend a statin - she declined previously, if agreeable, can prescribe rosuvastatin 5mg daily.

## 2024-11-06 RX ORDER — ROSUVASTATIN CALCIUM 5 MG/1
5 TABLET, COATED ORAL EVERY EVENING
Qty: 100 TABLET | Refills: 3 | Status: SHIPPED | OUTPATIENT
Start: 2024-11-06

## 2024-11-14 ENCOUNTER — APPOINTMENT (RX ONLY)
Dept: URBAN - METROPOLITAN AREA CLINIC 22 | Facility: CLINIC | Age: 74
Setting detail: DERMATOLOGY
End: 2024-11-14

## 2024-11-14 DIAGNOSIS — I78.8 OTHER DISEASES OF CAPILLARIES: ICD-10-CM

## 2024-11-14 DIAGNOSIS — Z71.89 OTHER SPECIFIED COUNSELING: ICD-10-CM

## 2024-11-14 DIAGNOSIS — L24 IRRITANT CONTACT DERMATITIS: ICD-10-CM

## 2024-11-14 DIAGNOSIS — L82.1 OTHER SEBORRHEIC KERATOSIS: ICD-10-CM

## 2024-11-14 DIAGNOSIS — D22 MELANOCYTIC NEVI: ICD-10-CM

## 2024-11-14 DIAGNOSIS — L71.8 OTHER ROSACEA: ICD-10-CM

## 2024-11-14 DIAGNOSIS — L81.4 OTHER MELANIN HYPERPIGMENTATION: ICD-10-CM

## 2024-11-14 PROBLEM — D22.5 MELANOCYTIC NEVI OF TRUNK: Status: ACTIVE | Noted: 2024-11-14

## 2024-11-14 PROBLEM — L24.9 IRRITANT CONTACT DERMATITIS, UNSPECIFIED CAUSE: Status: ACTIVE | Noted: 2024-11-14

## 2024-11-14 PROCEDURE — ? SUNSCREEN RECOMMENDATIONS

## 2024-11-14 PROCEDURE — ? PHOTO-DOCUMENTATION

## 2024-11-14 PROCEDURE — ? PRESCRIPTION

## 2024-11-14 PROCEDURE — 99213 OFFICE O/P EST LOW 20 MIN: CPT

## 2024-11-14 PROCEDURE — ? COUNSELING

## 2024-11-14 RX ORDER — METRONIDAZOLE 7.5 MG/G
CREAM TOPICAL BID
Qty: 45 | Refills: 5 | Status: ERX | COMMUNITY
Start: 2024-11-14

## 2024-11-14 RX ADMIN — METRONIDAZOLE: 7.5 CREAM TOPICAL at 00:00

## 2024-11-14 ASSESSMENT — LOCATION DETAILED DESCRIPTION DERM
LOCATION DETAILED: SUPERIOR THORACIC SPINE
LOCATION DETAILED: RIGHT VENTRAL PROXIMAL FOREARM
LOCATION DETAILED: NASAL DORSUM
LOCATION DETAILED: RIGHT INFERIOR POSTERIOR NECK
LOCATION DETAILED: INFERIOR THORACIC SPINE
LOCATION DETAILED: INFERIOR MID FOREHEAD
LOCATION DETAILED: LEFT INFERIOR POSTERIOR NECK
LOCATION DETAILED: LEFT VENTRAL PROXIMAL FOREARM

## 2024-11-14 ASSESSMENT — LOCATION ZONE DERM
LOCATION ZONE: NECK
LOCATION ZONE: NOSE
LOCATION ZONE: ARM
LOCATION ZONE: FACE
LOCATION ZONE: TRUNK

## 2024-11-14 ASSESSMENT — LOCATION SIMPLE DESCRIPTION DERM
LOCATION SIMPLE: INFERIOR FOREHEAD
LOCATION SIMPLE: RIGHT FOREARM
LOCATION SIMPLE: UPPER BACK
LOCATION SIMPLE: POSTERIOR NECK
LOCATION SIMPLE: LEFT FOREARM
LOCATION SIMPLE: NOSE

## 2024-11-21 ENCOUNTER — HOSPITAL ENCOUNTER (OUTPATIENT)
Dept: CARDIOLOGY | Facility: MEDICAL CENTER | Age: 74
End: 2024-11-21
Attending: STUDENT IN AN ORGANIZED HEALTH CARE EDUCATION/TRAINING PROGRAM
Payer: MEDICARE

## 2024-11-21 DIAGNOSIS — I47.10 PAROXYSMAL SVT (SUPRAVENTRICULAR TACHYCARDIA) (HCC): ICD-10-CM

## 2024-11-21 DIAGNOSIS — I35.1 NONRHEUMATIC AORTIC VALVE INSUFFICIENCY: ICD-10-CM

## 2024-11-21 DIAGNOSIS — R94.31 ABNORMAL EKG: ICD-10-CM

## 2024-11-21 PROCEDURE — 93306 TTE W/DOPPLER COMPLETE: CPT

## 2024-11-30 LAB
LV EJECT FRACT MOD 2C 99903: 66.59
LV EJECT FRACT MOD 4C 99902: 56.61
LV EJECT FRACT MOD BP 99901: 61.04

## 2024-12-30 ENCOUNTER — HOSPITAL ENCOUNTER (OUTPATIENT)
Dept: RADIOLOGY | Facility: MEDICAL CENTER | Age: 74
End: 2024-12-30
Attending: OBSTETRICS & GYNECOLOGY
Payer: MEDICARE

## 2024-12-30 DIAGNOSIS — R92.321 SCATTERED FIBROGLANDULAR TISSUE DENSITY OF RIGHT BREAST ON MAMMOGRAPHY: ICD-10-CM

## 2024-12-30 DIAGNOSIS — Z12.31 ENCOUNTER FOR MAMMOGRAM TO ESTABLISH BASELINE MAMMOGRAM: ICD-10-CM

## 2024-12-30 PROCEDURE — 76641 ULTRASOUND BREAST COMPLETE: CPT

## 2024-12-30 PROCEDURE — 77067 SCR MAMMO BI INCL CAD: CPT

## 2025-01-29 ENCOUNTER — HOSPITAL ENCOUNTER (OUTPATIENT)
Dept: LAB | Facility: MEDICAL CENTER | Age: 75
End: 2025-01-29
Attending: STUDENT IN AN ORGANIZED HEALTH CARE EDUCATION/TRAINING PROGRAM
Payer: MEDICARE

## 2025-01-29 DIAGNOSIS — E78.5 DYSLIPIDEMIA: ICD-10-CM

## 2025-01-29 LAB
CHOLEST SERPL-MCNC: 172 MG/DL (ref 100–199)
FASTING STATUS PATIENT QL REPORTED: NORMAL
HDLC SERPL-MCNC: 88 MG/DL
LDLC SERPL CALC-MCNC: 71 MG/DL
TRIGL SERPL-MCNC: 66 MG/DL (ref 0–149)

## 2025-01-29 PROCEDURE — 36415 COLL VENOUS BLD VENIPUNCTURE: CPT

## 2025-01-29 PROCEDURE — 80061 LIPID PANEL: CPT

## 2025-06-02 ENCOUNTER — TELEPHONE (OUTPATIENT)
Dept: MEDICAL GROUP | Facility: PHYSICIAN GROUP | Age: 75
End: 2025-06-02
Payer: MEDICARE

## 2025-06-02 NOTE — TELEPHONE ENCOUNTER
Spoke to patient she is interested in getting a new provider so I gave her the names of providers that are accepting NP at the [place] location and [place] location. I did let her know that there is a new provider for [place] however the schedule is not open at this time. No other question/concern at this time.

## 2025-08-12 DIAGNOSIS — E03.9 ACQUIRED HYPOTHYROIDISM: ICD-10-CM

## 2025-08-19 ENCOUNTER — OFFICE VISIT (OUTPATIENT)
Dept: MEDICAL GROUP | Facility: PHYSICIAN GROUP | Age: 75
End: 2025-08-19
Payer: MEDICARE

## 2025-08-19 VITALS
HEIGHT: 65 IN | TEMPERATURE: 99.2 F | SYSTOLIC BLOOD PRESSURE: 120 MMHG | DIASTOLIC BLOOD PRESSURE: 80 MMHG | HEART RATE: 65 BPM | WEIGHT: 130.6 LBS | OXYGEN SATURATION: 95 % | BODY MASS INDEX: 21.76 KG/M2

## 2025-08-19 DIAGNOSIS — E03.9 ACQUIRED HYPOTHYROIDISM: Primary | ICD-10-CM

## 2025-08-19 DIAGNOSIS — I47.10 PAROXYSMAL SVT (SUPRAVENTRICULAR TACHYCARDIA) (HCC): ICD-10-CM

## 2025-08-19 DIAGNOSIS — R93.1 ELEVATED CORONARY ARTERY CALCIUM SCORE: ICD-10-CM

## 2025-08-19 DIAGNOSIS — G47.9 DIFFICULTY SLEEPING: ICD-10-CM

## 2025-08-19 DIAGNOSIS — R53.83 FATIGUE, UNSPECIFIED TYPE: ICD-10-CM

## 2025-08-19 DIAGNOSIS — M85.80 SENILE OSTEOPENIA: ICD-10-CM

## 2025-08-19 DIAGNOSIS — E78.2 MIXED HYPERLIPIDEMIA: ICD-10-CM

## 2025-08-19 PROCEDURE — 99214 OFFICE O/P EST MOD 30 MIN: CPT | Performed by: FAMILY MEDICINE

## 2025-08-19 PROCEDURE — 3079F DIAST BP 80-89 MM HG: CPT | Performed by: FAMILY MEDICINE

## 2025-08-19 PROCEDURE — 3074F SYST BP LT 130 MM HG: CPT | Performed by: FAMILY MEDICINE

## 2025-08-19 RX ORDER — LEVOTHYROXINE SODIUM 75 UG/1
75 TABLET ORAL
Qty: 100 TABLET | Refills: 0 | Status: SHIPPED | OUTPATIENT
Start: 2025-08-19

## 2025-08-19 ASSESSMENT — FIBROSIS 4 INDEX: FIB4 SCORE: 1

## 2025-08-19 ASSESSMENT — PATIENT HEALTH QUESTIONNAIRE - PHQ9: CLINICAL INTERPRETATION OF PHQ2 SCORE: 0

## 2025-08-20 ENCOUNTER — HOSPITAL ENCOUNTER (OUTPATIENT)
Dept: LAB | Facility: MEDICAL CENTER | Age: 75
End: 2025-08-20
Attending: FAMILY MEDICINE
Payer: MEDICARE

## 2025-08-20 DIAGNOSIS — E03.9 ACQUIRED HYPOTHYROIDISM: ICD-10-CM

## 2025-08-20 DIAGNOSIS — M85.80 SENILE OSTEOPENIA: ICD-10-CM

## 2025-08-20 DIAGNOSIS — E78.2 MIXED HYPERLIPIDEMIA: ICD-10-CM

## 2025-08-20 DIAGNOSIS — R53.83 FATIGUE, UNSPECIFIED TYPE: ICD-10-CM

## 2025-08-20 LAB
25(OH)D3 SERPL-MCNC: 108 NG/ML (ref 30–100)
ALBUMIN SERPL BCP-MCNC: 4.2 G/DL (ref 3.2–4.9)
ALBUMIN/GLOB SERPL: 1.8 G/DL
ALP SERPL-CCNC: 71 U/L (ref 30–99)
ALT SERPL-CCNC: 25 U/L (ref 2–50)
ANION GAP SERPL CALC-SCNC: 11 MMOL/L (ref 7–16)
AST SERPL-CCNC: 27 U/L (ref 12–45)
BILIRUB SERPL-MCNC: 0.4 MG/DL (ref 0.1–1.5)
BUN SERPL-MCNC: 12 MG/DL (ref 8–22)
CALCIUM ALBUM COR SERPL-MCNC: 9.1 MG/DL (ref 8.5–10.5)
CALCIUM SERPL-MCNC: 9.3 MG/DL (ref 8.5–10.5)
CHLORIDE SERPL-SCNC: 99 MMOL/L (ref 96–112)
CO2 SERPL-SCNC: 24 MMOL/L (ref 20–33)
CREAT SERPL-MCNC: 0.76 MG/DL (ref 0.5–1.4)
ERYTHROCYTE [DISTWIDTH] IN BLOOD BY AUTOMATED COUNT: 41.5 FL (ref 35.9–50)
GFR SERPLBLD CREATININE-BSD FMLA CKD-EPI: 82 ML/MIN/1.73 M 2
GLOBULIN SER CALC-MCNC: 2.4 G/DL (ref 1.9–3.5)
GLUCOSE SERPL-MCNC: 82 MG/DL (ref 65–99)
HCT VFR BLD AUTO: 37.1 % (ref 37–47)
HGB BLD-MCNC: 12.8 G/DL (ref 12–16)
MCH RBC QN AUTO: 31 PG (ref 27–33)
MCHC RBC AUTO-ENTMCNC: 34.5 G/DL (ref 32.2–35.5)
MCV RBC AUTO: 89.8 FL (ref 81.4–97.8)
PLATELET # BLD AUTO: 287 K/UL (ref 164–446)
PMV BLD AUTO: 11.4 FL (ref 9–12.9)
POTASSIUM SERPL-SCNC: 4.5 MMOL/L (ref 3.6–5.5)
PROT SERPL-MCNC: 6.6 G/DL (ref 6–8.2)
RBC # BLD AUTO: 4.13 M/UL (ref 4.2–5.4)
SODIUM SERPL-SCNC: 134 MMOL/L (ref 135–145)
TSH SERPL-ACNC: 0.23 UIU/ML (ref 0.38–5.33)
VIT B12 SERPL-MCNC: 871 PG/ML (ref 211–911)
WBC # BLD AUTO: 5.8 K/UL (ref 4.8–10.8)

## 2025-08-20 PROCEDURE — 82306 VITAMIN D 25 HYDROXY: CPT

## 2025-08-20 PROCEDURE — 82607 VITAMIN B-12: CPT

## 2025-08-20 PROCEDURE — 85027 COMPLETE CBC AUTOMATED: CPT

## 2025-08-20 PROCEDURE — 84443 ASSAY THYROID STIM HORMONE: CPT

## 2025-08-20 PROCEDURE — 80053 COMPREHEN METABOLIC PANEL: CPT

## 2025-08-20 PROCEDURE — 36415 COLL VENOUS BLD VENIPUNCTURE: CPT

## 2025-08-20 RX ORDER — LEVOTHYROXINE SODIUM 75 MCG
TABLET ORAL
Qty: 100 TABLET | OUTPATIENT
Start: 2025-08-20

## 2025-08-22 ENCOUNTER — RESULTS FOLLOW-UP (OUTPATIENT)
Dept: MEDICAL GROUP | Facility: PHYSICIAN GROUP | Age: 75
End: 2025-08-22
Payer: MEDICARE

## 2025-08-22 DIAGNOSIS — R94.6 ABNORMAL THYROID FUNCTION TEST: Primary | ICD-10-CM

## 2025-08-22 DIAGNOSIS — E87.1 HYPONATREMIA: ICD-10-CM

## 2025-08-22 DIAGNOSIS — E03.9 ACQUIRED HYPOTHYROIDISM: ICD-10-CM

## 2025-08-28 ENCOUNTER — OFFICE VISIT (OUTPATIENT)
Facility: MEDICAL CENTER | Age: 75
End: 2025-08-28
Attending: INTERNAL MEDICINE
Payer: MEDICARE

## 2025-08-28 VITALS
BODY MASS INDEX: 21.66 KG/M2 | SYSTOLIC BLOOD PRESSURE: 114 MMHG | WEIGHT: 130 LBS | OXYGEN SATURATION: 95 % | DIASTOLIC BLOOD PRESSURE: 70 MMHG | HEIGHT: 65 IN | HEART RATE: 60 BPM | RESPIRATION RATE: 16 BRPM

## 2025-08-28 DIAGNOSIS — R94.31 NONSPECIFIC ABNORMAL ELECTROCARDIOGRAM (ECG) (EKG): ICD-10-CM

## 2025-08-28 DIAGNOSIS — I25.10 CORONARY ARTERY CALCIFICATION SEEN ON CT SCAN: ICD-10-CM

## 2025-08-28 DIAGNOSIS — R00.2 PALPITATIONS: ICD-10-CM

## 2025-08-28 DIAGNOSIS — E78.2 MIXED HYPERLIPIDEMIA: ICD-10-CM

## 2025-08-28 DIAGNOSIS — I47.10 PAROXYSMAL SVT (SUPRAVENTRICULAR TACHYCARDIA) (HCC): Primary | ICD-10-CM

## 2025-08-28 DIAGNOSIS — G25.0 ESSENTIAL TREMOR: ICD-10-CM

## 2025-08-28 PROCEDURE — 3074F SYST BP LT 130 MM HG: CPT | Performed by: INTERNAL MEDICINE

## 2025-08-28 PROCEDURE — 99213 OFFICE O/P EST LOW 20 MIN: CPT | Performed by: INTERNAL MEDICINE

## 2025-08-28 PROCEDURE — 99214 OFFICE O/P EST MOD 30 MIN: CPT | Performed by: INTERNAL MEDICINE

## 2025-08-28 PROCEDURE — 3078F DIAST BP <80 MM HG: CPT | Performed by: INTERNAL MEDICINE

## 2025-08-28 RX ORDER — PROPRANOLOL HYDROCHLORIDE 10 MG/1
10 TABLET ORAL SEE ADMIN INSTRUCTIONS
Qty: 90 TABLET | Refills: 0 | Status: SHIPPED | OUTPATIENT
Start: 2025-08-28

## 2025-08-28 ASSESSMENT — FIBROSIS 4 INDEX: FIB4 SCORE: 1.39
